# Patient Record
Sex: FEMALE | Race: BLACK OR AFRICAN AMERICAN | Employment: OTHER | URBAN - METROPOLITAN AREA
[De-identification: names, ages, dates, MRNs, and addresses within clinical notes are randomized per-mention and may not be internally consistent; named-entity substitution may affect disease eponyms.]

---

## 2017-01-10 ENCOUNTER — OFFICE VISIT (OUTPATIENT)
Dept: CARDIOLOGY CLINIC | Age: 82
End: 2017-01-10

## 2017-01-10 VITALS
SYSTOLIC BLOOD PRESSURE: 120 MMHG | DIASTOLIC BLOOD PRESSURE: 60 MMHG | WEIGHT: 138 LBS | OXYGEN SATURATION: 97 % | RESPIRATION RATE: 16 BRPM | HEART RATE: 70 BPM | HEIGHT: 64 IN | BODY MASS INDEX: 23.56 KG/M2

## 2017-01-10 DIAGNOSIS — I25.111 ATHEROSCLEROSIS OF NATIVE CORONARY ARTERY OF NATIVE HEART WITH ANGINA PECTORIS WITH DOCUMENTED SPASM (HCC): ICD-10-CM

## 2017-01-10 DIAGNOSIS — E78.2 MIXED HYPERLIPIDEMIA: Primary | ICD-10-CM

## 2017-01-10 DIAGNOSIS — R06.02 SOB (SHORTNESS OF BREATH) ON EXERTION: ICD-10-CM

## 2017-01-10 DIAGNOSIS — Z98.61 POSTSURGICAL PERCUTANEOUS TRANSLUMINAL CORONARY ANGIOPLASTY STATUS: ICD-10-CM

## 2017-01-10 NOTE — PROGRESS NOTES
[address] [place] [address] 60, [place], [address] [facility]     Alexandra Chavez is a 80 y.o. female. Last seen 6 months ago. Subjective:     She notes of chest pain with eating (resembling a heart burn) and usually resolves on its own. She denies her pain occurs with activity or at rest.       BP is at target today. She is adherent with her medications. Lipids are monitored by Dr. Felix Abbott. She notes her levels are at goal today. Patient denies any exertional chest pain, dyspnea, palpitations, syncope, orthopnea, edema or paroxysmal nocturnal dyspnea. Patient Active Problem List    Diagnosis Date Noted    SOB (shortness of breath) on exertion 05/19/2015    Postsurgical percutaneous transluminal coronary angioplasty status 04/27/2012    Coronary atherosclerosis of native coronary artery 04/27/2012    Acute myocardial infarction of other inferior wall, subsequent episode of care 04/27/2012    Mixed hyperlipidemia 04/27/2012      Kayleigh Almanzar MD  Past Medical History   Diagnosis Date    Abnormality of gait     Essential hypertension, benign     Old myocardial infarction     Other acute and subacute form of ischemic heart disease     Peripheral vascular disease, unspecified ([facility] [address]       No past surgical history on file. No Known Allergies   No family history on file. Social History     Social History    Marital status: SINGLE     Spouse name: N/A    Number of children: N/A    Years of education: N/A     Occupational History    Not on file. Social History Main Topics    Smoking status: Never Smoker    Smokeless tobacco: Never Used    Alcohol use Not on file    Drug use: Not on file    Sexual activity: Not on file     Other Topics Concern    Not on file     Social History Narrative      Current Outpatient Prescriptions   Medication Sig    cyanocobalamin (VITAMIN B-12) 1,000 mcg tablet Take 1,000 mcg by mouth daily.     cholecalciferol, vitamin D3, (VITAMIN D3) 2,000 unit Tab Take 1 Cap by mouth daily.  omega-3 fatty acids-vitamin e (FISH OIL) 1,000 mg Cap Take 2 Caps by mouth.  aspirin 81 mg tablet Take 81 mg by mouth.  lisinopril-hydrochlorothiazide (PRINZIDE, ZESTORETIC) 20-12.5 mg per tablet Take 1 Tab by mouth daily.  methimazole (TAPAZOLE) 5 mg tablet Take 5 mg by mouth every other day.  metoprolol (LOPRESSOR) 50 mg tablet Take 50 mg by mouth two (2) times a day.  simvastatin (ZOCOR) 40 mg tablet Take 40 mg by mouth nightly.  valACYclovir (VALTREX) 500 mg tablet Take 500 mg by mouth as needed. No current facility-administered medications for this visit. Review of Systems  Constitutional: Negative for fever, chills, malaise/fatigue and diaphoresis. Respiratory: Negative for cough, hemoptysis, sputum production, shortness of breath and wheezing. Cardiovascular: Negative for chest pain, palpitations, orthopnea, claudication, leg swelling and PND. Positive for heart burn. Gastrointestinal: Negative for heartburn, nausea, vomiting, blood in stool and melena. Genitourinary: Negative for dysuria and flank pain. Musculoskeletal: Negative for joint pain and back pain. Skin: Negative for rash. Neurological: Negative for focal weakness, seizures, loss of consciousness, weakness and headaches. Endo/Heme/Allergies: Does not bruise/bleed easily. Psychiatric/Behavioral: Negative for memory loss. The patient does not have insomnia. Physical Exam:    Visit Vitals    /60  Comment: lt/rt/reg    Pulse 70    Resp 16    Ht 5' 4\" (1.626 m)    Wt 138 lb (62.6 kg)    SpO2 97%    BMI 23.69 kg/m2     Wt Readings from Last 3 Encounters:   01/10/17 138 lb (62.6 kg)   07/19/16 136 lb 3.2 oz (61.8 kg)   12/01/15 142 lb (64.4 kg)       Gen: NAD    Mental Status - Alert. General Appearance - Not in acute distress. Neck - no JVD    Chest and Lung Exam   Inspection: Accessory muscles - No use of accessory muscles in breathing.    Auscultation:   Breath sounds: - Normal.     Cardiovascular   Inspection: Jugular vein - Bilateral - Inspection Normal.   Palpation/Percussion:   Apical Impulse: - Normal.   Auscultation: Rhythm - Regular. Heart Sounds - S1 WNL and S2 WNL. No S3 or S4. Murmurs & Other Heart Sounds: Auscultation of the heart reveals - No Murmurs. Peripheral Vascular   Upper Extremity: Inspection - Bilateral - No Cyanotic nailbeds or Digital clubbing. Lower Extremity:   Palpation: Edema - Bilateral - No edema. Abdomen: Soft, non-tender, bowel sounds are active. Neuro: A&O times 3, CN and motor grossly WNL    Cardiographics  EKG 01/10/17 - SR, old inferior MI      Assessment:     Encounter Diagnoses   Name Primary? 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 Mallory Abdi presents to the clinic today for routine 6 month follow up. She is doing well from a cardiac standpoint. Her blood pressure and lipids are at goal. She seems to be adherent with all of her medications. We discontinued Niacin today. Follow up in 6 months or sooner as needed. EKG OK.       Written by Dayna Parker, as dictated by Ashlyn Frankel MD.

## 2017-01-10 NOTE — MR AVS SNAPSHOT
Visit Information Date & Time Provider Department Dept. Phone Encounter #  
 1/10/2017 10:15 AM Gloria Calderón, [address] [facility] [phone] [id number] Follow-up Instructions Return in about 6 months (around 7/10/2017). Your Appointments 6/13/2017  9:45 AM  
6 MONTH with Gloria Calderón MD  
[place] [facility] [address] [place] [address]) [address] [place] [address] [address] [facility] [address]  
[phone]  
  
   
 [address] [place] [address] [address] Upcoming Health Maintenance Date Due DTaP/Tdap/Td series (1 - Tdap) 8/18/1953 ZOSTER VACCINE AGE 60> 8/18/1992 GLAUCOMA SCREENING Q2Y 8/18/1997 OSTEOPOROSIS SCREENING (DEXA) 8/18/1997 Pneumococcal 65+ Low/Medium Risk (1 of 2 - PCV13) 8/18/1997 MEDICARE YEARLY EXAM 8/18/1997 INFLUENZA AGE 9 TO ADULT 8/1/2016 Allergies as of 1/10/2017  Review Complete On: 1/10/2017 By: Gloria Calderón MD  
 No Known Allergies Current Immunizations  Never Reviewed No immunizations on file. Not reviewed this visit You Were Diagnosed With   
  
 Codes Comments Mixed hyperlipidemia    -  Primary ICD-10-CM: P09.3 ICD-9-CM: 272.2 Atherosclerosis of native coronary artery of native heart with angina pectoris with documented spasm ([facility] [address]     ICD-10-CM: I25.111 ICD-9-CM: 414.01, 413.9 SOB (shortness of breath) on exertion     ICD-10-CM: R06.02 
ICD-9-CM: 786.05 Postsurgical percutaneous transluminal coronary angioplasty status     ICD-10-CM: Z98.61 ICD-9-CM: V45.82 Vitals BP Pulse Resp Height(growth percentile) Weight(growth percentile) SpO2  
 120/60 70 16 5' 4\" (1.626 m) 138 lb (62.6 kg) 97% BMI Smoking Status 23.69 kg/m2 Never Smoker BMI and BSA Data Body Mass Index Body Surface Area  
 23.69 kg/m 2 1.68 m 2 Your Updated Medication List  
  
   
 This list is accurate as of: 1/10/17 10:21 AM.  Always use your most recent med list.  
  
  
  
  
 aspirin 81 mg tablet Take 81 mg by mouth. FISH OIL 1,000 mg Cap Generic drug:  omega-3 fatty acids-vitamin e Take 2 Caps by mouth.  
  
 lisinopril-hydroCHLOROthiazide 20-12.5 mg per tablet Commonly known as:  Ana Rodolfo Take 1 Tab by mouth daily. methIMAzole 5 mg tablet Commonly known as:  TAPAZOLE Take 5 mg by mouth every other day. metoprolol tartrate 50 mg tablet Commonly known as:  LOPRESSOR Take 50 mg by mouth two (2) times a day. simvastatin 40 mg tablet Commonly known as:  ZOCOR Take 40 mg by mouth nightly. valACYclovir 500 mg tablet Commonly known as:  VALTREX Take 500 mg by mouth as needed. VITAMIN B-12 1,000 mcg tablet Generic drug:  cyanocobalamin Take 1,000 mcg by mouth daily. VITAMIN D3 2,000 unit Tab Generic drug:  cholecalciferol (vitamin D3) Take 1 Cap by mouth daily. We Performed the Following AMB POC EKG ROUTINE W/ 12 LEADS, INTER & REP [13261 CPT(R)] Follow-up Instructions Return in about 6 months (around 7/10/2017). Introducing [facility] & HEALTH SERVICES! Concepcion Bahena introduces [employer] patient portal. Now you can access parts of your medical record, email your doctor's office, and request medication refills online. 1. In your internet browser, go to [email or web address] 2. Click on the First Time User? Click Here link in the Sign In box. You will see the New Member Sign Up page. 3. Enter your [employer] Access Code exactly as it appears below. You will not need to use this code after youve completed the sign-up process. If you do not sign up before the expiration date, you must request a new code. · [employer] Access Code: [email or web address] Expires: 4/10/2017  9:48 AM 
 
4.  Enter the last four digits of your Social Security Number (xxxx) and Date of Birth (mm/dd/yyyy) as indicated and click Submit. You will be taken to the next sign-up page. 5. Create a [email or web address] ID. This will be your [email or web address] login ID and cannot be changed, so think of one that is secure and easy to remember. 6. Create a [email or web address] password. You can change your password at any time. 7. Enter your Password Reset Question and Answer. This can be used at a later time if you forget your password. 8. Enter your e-mail address. You will receive e-mail notification when new information is available in [address] 9. Click Sign Up. You can now view and download portions of your medical record. 10. Click the Download Summary menu link to download a portable copy of your medical information. If you have questions, please visit the Frequently Asked Questions section of the [email or web address] website. Remember, [email or web address] is NOT to be used for urgent needs. For medical emergencies, dial 911. Now available from your iPhone and Android! Please provide this summary of care documentation to your next provider. Your primary care clinician is listed as Vamsi Hampton. If you have any questions after today's visit, please call [phone].

## 2017-06-13 ENCOUNTER — OFFICE VISIT (OUTPATIENT)
Dept: CARDIOLOGY CLINIC | Age: 82
End: 2017-06-13

## 2017-06-13 VITALS
OXYGEN SATURATION: 97 % | SYSTOLIC BLOOD PRESSURE: 160 MMHG | DIASTOLIC BLOOD PRESSURE: 74 MMHG | BODY MASS INDEX: 23.05 KG/M2 | WEIGHT: 135 LBS | RESPIRATION RATE: 18 BRPM | HEART RATE: 73 BPM | HEIGHT: 64 IN

## 2017-06-13 DIAGNOSIS — I25.10 ATHEROSCLEROSIS OF NATIVE CORONARY ARTERY OF NATIVE HEART WITHOUT ANGINA PECTORIS: ICD-10-CM

## 2017-06-13 DIAGNOSIS — Z98.61 POSTSURGICAL PERCUTANEOUS TRANSLUMINAL CORONARY ANGIOPLASTY STATUS: ICD-10-CM

## 2017-06-13 DIAGNOSIS — E78.2 MIXED HYPERLIPIDEMIA: Primary | ICD-10-CM

## 2017-06-13 RX ORDER — BISMUTH SUBSALICYLATE 262 MG
1 TABLET,CHEWABLE ORAL DAILY
COMMUNITY

## 2017-06-13 NOTE — PROGRESS NOTES
[address] [place] [address] 60, [place], 16[address] [facility]     Merly Crespo is a 80 y.o. female. Last seen 6 months ago. Subjective:     Mrs. Alverda Spatz is doing well today with no interval issues. She reports being normotensive at home though her systolic is elevated in the office today. Lipids are monitored by Dr. Margarette Samuels and are at goal. Patient denies any exertional chest pain, dyspnea, palpitations, syncope, orthopnea, edema or paroxysmal nocturnal dyspnea. Patient Active Problem List    Diagnosis Date Noted    SOB (shortness of breath) on exertion 05/19/2015    Postsurgical percutaneous transluminal coronary angioplasty status 04/27/2012    Coronary atherosclerosis of native coronary artery 04/27/2012    Acute myocardial infarction of other inferior wall, subsequent episode of care 04/27/2012    Mixed hyperlipidemia 04/27/2012      Dearl MD Diony  Past Medical History:   Diagnosis Date    Abnormality of gait     Essential hypertension, benign     Old myocardial infarction     Other acute and subacute form of ischemic heart disease     Peripheral vascular disease, unspecified ([facility] [address]       No past surgical history on file. No Known Allergies   No family history on file. Social History     Social History    Marital status: SINGLE     Spouse name: N/A    Number of children: N/A    Years of education: N/A     Occupational History    Not on file. Social History Main Topics    Smoking status: Never Smoker    Smokeless tobacco: Never Used    Alcohol use Not on file    Drug use: Not on file    Sexual activity: Not on file     Other Topics Concern    Not on file     Social History Narrative      Current Outpatient Prescriptions   Medication Sig    multivitamin (ONE A DAY) tablet Take 1 Tab by mouth daily.  cyanocobalamin (VITAMIN B-12) 1,000 mcg tablet Take 1,000 mcg by mouth daily.  cholecalciferol, vitamin D3, (VITAMIN D3) 2,000 unit Tab Take 1 Cap by mouth daily.     omega-3 fatty acids-vitamin e (FISH OIL) 1,000 mg Cap Take 2 Caps by mouth.  aspirin 81 mg tablet Take 81 mg by mouth.  lisinopril-hydrochlorothiazide (PRINZIDE, ZESTORETIC) 20-12.5 mg per tablet Take 1 Tab by mouth daily.  methimazole (TAPAZOLE) 5 mg tablet Take 5 mg by mouth every other day.  metoprolol (LOPRESSOR) 50 mg tablet Take 50 mg by mouth two (2) times a day.  simvastatin (ZOCOR) 40 mg tablet Take 40 mg by mouth nightly.  valACYclovir (VALTREX) 500 mg tablet Take 500 mg by mouth as needed. No current facility-administered medications for this visit. Review of Systems  Constitutional: Negative for fever, chills, malaise/fatigue and diaphoresis. Respiratory: Negative for cough, hemoptysis, sputum production, shortness of breath and wheezing. Cardiovascular: Negative for chest pain, palpitations, orthopnea, claudication, leg swelling and PND. Gastrointestinal: Negative for heartburn, nausea, vomiting, blood in stool and melena. Genitourinary: Negative for dysuria and flank pain. Musculoskeletal: Negative for joint pain and back pain. Skin: Negative for rash. Neurological: Negative for focal weakness, seizures, loss of consciousness, weakness and headaches. Endo/Heme/Allergies: Does not bruise/bleed easily. Psychiatric/Behavioral: Negative for memory loss. The patient does not have insomnia. Physical Exam:    Visit Vitals    /74 (BP 1 Location: Right arm, BP Patient Position: Sitting)    Pulse 73    Resp 18    Ht 5' 4\" (1.626 m)    Wt 135 lb (61.2 kg)    SpO2 97%    BMI 23.17 kg/m2     Wt Readings from Last 3 Encounters:   06/13/17 135 lb (61.2 kg)   01/10/17 138 lb (62.6 kg)   07/19/16 136 lb 3.2 oz (61.8 kg)       Gen: NAD    Mental Status - Alert. General Appearance - Not in acute distress. Neck - no JVD    Chest and Lung Exam   Inspection: Accessory muscles - No use of accessory muscles in breathing.    Auscultation:   Breath sounds: - Normal. Cardiovascular   Inspection: Jugular vein - Bilateral - Inspection Normal.   Palpation/Percussion:   Apical Impulse: - Normal.   Auscultation: Rhythm - Regular. Heart Sounds - S1 WNL and S2 WNL. No S3 or S4. Murmurs & Other Heart Sounds: Auscultation of the heart reveals - No Murmurs. Peripheral Vascular   Upper Extremity: Inspection - Bilateral - No Cyanotic nailbeds or Digital clubbing. Lower Extremity:   Palpation: Edema - Bilateral - No edema. Abdomen: Soft, non-tender, bowel sounds are active. Neuro: A&O times 3, CN and motor grossly WNL    Cardiographics  NSR, WNL     Assessment:     Encounter Diagnoses   Name Primary?  Mixed hyperlipidemia Yes    Atherosclerosis of native coronary artery of native heart with angina pectoris with documented spasm [facility])           Plan:     Mrs. Nohemi Anderson is doing well with no recurrent chest pain. Her systolic BP is elevated in the office today, but diastolic is at target therefore will not make any adjustments to her medical regimen. Advised her to follow a low sodium diet. EKG is ok. Follow up in 6 months.

## 2017-07-07 ENCOUNTER — HOSPITAL ENCOUNTER (OUTPATIENT)
Dept: MAMMOGRAPHY | Age: 82
Discharge: HOME | End: 2017-07-07
Attending: FAMILY MEDICINE
Payer: MEDICARE

## 2017-07-07 DIAGNOSIS — Z12.31 VISIT FOR SCREENING MAMMOGRAM: ICD-10-CM

## 2017-07-07 PROCEDURE — 77067 SCR MAMMO BI INCL CAD: CPT

## 2017-07-20 ENCOUNTER — HOSPITAL ENCOUNTER (OUTPATIENT)
Dept: ULTRASOUND IMAGING | Age: 82
Discharge: HOME | End: 2017-07-20
Attending: FAMILY MEDICINE
Payer: MEDICARE

## 2017-07-20 ENCOUNTER — HOSPITAL ENCOUNTER (OUTPATIENT)
Dept: MAMMOGRAPHY | Age: 82
Discharge: HOME | End: 2017-07-20
Attending: FAMILY MEDICINE
Payer: MEDICARE

## 2017-07-20 DIAGNOSIS — R92.8 ABNORMAL MAMMOGRAM OF LEFT BREAST: ICD-10-CM

## 2017-07-20 PROCEDURE — 76642 ULTRASOUND BREAST LIMITED: CPT

## 2017-07-20 PROCEDURE — 77065 DX MAMMO INCL CAD UNI: CPT

## 2017-11-29 ENCOUNTER — HOSPITAL ENCOUNTER (OUTPATIENT)
Dept: NUCLEAR MEDICINE | Age: 82
Discharge: HOME | End: 2017-11-29
Attending: SPECIALIST
Payer: MEDICARE

## 2017-11-29 DIAGNOSIS — E04.1 NONTOXIC UNINODULAR GOITER: ICD-10-CM

## 2017-11-30 ENCOUNTER — HOSPITAL ENCOUNTER (OUTPATIENT)
Dept: NUCLEAR MEDICINE | Age: 82
Discharge: HOME | End: 2017-11-30
Attending: SPECIALIST
Payer: MEDICARE

## 2017-11-30 ENCOUNTER — HOSPITAL ENCOUNTER (OUTPATIENT)
Dept: ULTRASOUND IMAGING | Age: 82
Discharge: HOME | End: 2017-11-30
Attending: SPECIALIST
Payer: MEDICARE

## 2017-11-30 DIAGNOSIS — E04.1 NONTOXIC UNINODULAR GOITER: ICD-10-CM

## 2017-11-30 PROCEDURE — 76536 US EXAM OF HEAD AND NECK: CPT

## 2018-01-22 ENCOUNTER — HOSPITAL ENCOUNTER (OUTPATIENT)
Dept: MAMMOGRAPHY | Age: 83
Discharge: HOME | End: 2018-01-22
Attending: FAMILY MEDICINE
Payer: MEDICARE

## 2018-01-22 ENCOUNTER — HOSPITAL ENCOUNTER (OUTPATIENT)
Dept: ULTRASOUND IMAGING | Age: 83
End: 2018-01-22
Attending: FAMILY MEDICINE
Payer: MEDICARE

## 2018-01-22 DIAGNOSIS — R92.2 BREAST DENSITY: ICD-10-CM

## 2018-01-22 DIAGNOSIS — R92.8 ABNORMAL MAMMOGRAM: ICD-10-CM

## 2018-01-22 PROCEDURE — 77065 DX MAMMO INCL CAD UNI: CPT

## 2018-02-27 ENCOUNTER — OFFICE VISIT (OUTPATIENT)
Dept: CARDIOLOGY CLINIC | Age: 83
End: 2018-02-27

## 2018-02-27 VITALS
WEIGHT: 121.4 LBS | DIASTOLIC BLOOD PRESSURE: 68 MMHG | HEART RATE: 70 BPM | HEIGHT: 64 IN | BODY MASS INDEX: 20.73 KG/M2 | SYSTOLIC BLOOD PRESSURE: 148 MMHG | RESPIRATION RATE: 18 BRPM | OXYGEN SATURATION: 97 %

## 2018-02-27 DIAGNOSIS — E78.2 MIXED HYPERLIPIDEMIA: Primary | ICD-10-CM

## 2018-02-27 DIAGNOSIS — I25.10 ATHEROSCLEROSIS OF NATIVE CORONARY ARTERY OF NATIVE HEART WITHOUT ANGINA PECTORIS: ICD-10-CM

## 2018-02-27 NOTE — PROGRESS NOTES
1. Have you been to the ER, urgent care clinic since your last visit? Hospitalized since your last visit? NO.    2. Have you seen or consulted any other health care providers outside of the [address] [place] [address] since your last visit? Include any pap smears or colon screening. SEES HER PCP.       Chief Complaint   Patient presents with    Other     6 month- pt denies any cardiac symptoms

## 2018-02-27 NOTE — MR AVS SNAPSHOT
[facility] Vijaya [place] [address] 
[phone] Patient: Juliocesar Yeh MRN: [id number] Shani Ohs Visit Information Date & Time Provider Department Dept. Phone Encounter #  
 2/27/2018 10:15 AM Merline Hitch, [address] [place] [address] Cardiology [facility] [phone] [id number] Follow-up Instructions Return in about 6 months (around 8/27/2018). Your Appointments 2/28/2018 11:00 AM  
6 MONTH with Merline Hitch, MD  
[facility] [facility]) Appt Note: Per DR. Nora Akers [address] [place] [address]  
[phone] [address] [facility] Upcoming Health Maintenance Date Due DTaP/Tdap/Td series (1 - Tdap) 8/18/1953 ZOSTER VACCINE AGE 60> 6/18/1992 GLAUCOMA SCREENING Q2Y 8/18/1997 OSTEOPOROSIS SCREENING (DEXA) 8/18/1997 Pneumococcal 65+ Low/Medium Risk (1 of 2 - PCV13) 8/18/1997 MEDICARE YEARLY EXAM 8/18/1997 Influenza Age 5 to Adult 8/1/2017 Allergies as of 2/27/2018  Review Complete On: 2/27/2018 By: Merline Hitch, MD  
 No Known Allergies Current Immunizations  Never Reviewed No immunizations on file. Not reviewed this visit You Were Diagnosed With   
  
 Codes Comments Mixed hyperlipidemia    -  Primary ICD-10-CM: N54.5 ICD-9-CM: 272.2 Atherosclerosis of native coronary artery of native heart without angina pectoris     ICD-10-CM: I25.10 ICD-9-CM: 414.01 Vitals BP Pulse Resp Height(growth percentile) Weight(growth percentile) SpO2  
 148/68 (BP 1 Location: Right arm, BP Patient Position: Sitting) 70 18 5' 4\" (1.626 m) 121 lb 6.4 oz (55.1 kg) 97% BMI Smoking Status 20.84 kg/m2 Never Smoker Vitals History BMI and BSA Data Body Mass Index Body Surface Area  
 20.84 kg/m 2 1.58 m 2 Your Updated Medication List  
  
   
 This list is accurate as of 2/27/18 11:10 AM.  Always use your most recent med list.  
  
  
  
  
 aspirin 81 mg tablet Take 81 mg by mouth. FISH OIL 1,000 mg Cap Generic drug:  omega-3 fatty acids-vitamin e Take 2 Caps by mouth.  
  
 lisinopril-hydroCHLOROthiazide 20-12.5 mg per tablet Commonly known as:  Delora [place] Take 1 Tab by mouth daily. methIMAzole 5 mg tablet Commonly known as:  TAPAZOLE Take 5 mg by mouth every other day. metoprolol tartrate 50 mg tablet Commonly known as:  LOPRESSOR Take 50 mg by mouth two (2) times a day. multivitamin tablet Commonly known as:  ONE A DAY Take 1 Tab by mouth daily. simvastatin 40 mg tablet Commonly known as:  ZOCOR Take 40 mg by mouth nightly. valACYclovir 500 mg tablet Commonly known as:  VALTREX Take 500 mg by mouth as needed. VITAMIN B-12 1,000 mcg tablet Generic drug:  cyanocobalamin Take 1,000 mcg by mouth daily. VITAMIN D3 2,000 unit Tab Generic drug:  cholecalciferol (vitamin D3) Take 1 Cap by mouth daily. We Performed the Following AMB POC EKG ROUTINE W/ 12 LEADS, INTER & REP [62500 CPT(R)] Follow-up Instructions Return in about 6 months (around 8/27/2018). To-Do List   
 05/07/2018 8:30 AM  
  Appointment with Radha Tamayo at [facility] Ultrasound ([phone]) GENERAL INSTRUCTIONS  1. Bring outside films ([employer]) pertaining to the affected area with you on the day of appointment. 2. A written order with a valid diagnosis and Physicians signature is required for all scheduled tests. 3. Check in at registration 30 minutes before your appointment time unless you were instructed to do otherwise. Introducing [facility] & HEALTH SERVICES! Meliton García introduces [employer] patient portal. Now you can access parts of your medical record, email your doctor's office, and request medication refills online. 1. In your internet browser, go to [email or web address] 2. Click on the First Time User? Click Here link in the Sign In box. You will see the New Member Sign Up page. 3. Enter your [employer] Access Code exactly as it appears below. You will not need to use this code after youve completed the sign-up process. If you do not sign up before the expiration date, you must request a new code. · [employer] Access Code: [email or web address] Expires: 5/28/2018  9:49 AM 
 
4. Enter the last four digits of your Social Security Number (xxxx) and Date of Birth (mm/dd/yyyy) as indicated and click Submit. You will be taken to the next sign-up page. 5. Create a WaterBear Softt ID. This will be your PlanHQ login ID and cannot be changed, so think of one that is secure and easy to remember. 6. Create a [employer]HQ password. You can change your password at any time. 7. Enter your Password Reset Question and Answer. This can be used at a later time if you forget your password. 8. Enter your e-mail address. You will receive e-mail notification when new information is available in [address] 9. Click Sign Up. You can now view and download portions of your medical record. 10. Click the Download Summary menu link to download a portable copy of your medical information. If you have questions, please visit the Frequently Asked Questions section of the [employer] website. Remember, [employer] is NOT to be used for urgent needs. For medical emergencies, dial 911. Now available from your iPhone and Android! Please provide this summary of care documentation to your next provider. Your primary care clinician is listed as Vamsi Hampton. If you have any questions after today's visit, please call [phone].

## 2018-02-27 NOTE — PROGRESS NOTES
[address] [place] [address] [address], [place], [address] [facility]     Quinn Robertson is a 80 y.o. female. Last seen 8 months ago. Subjective:       Mrs. Sarah De Jesus is doing well without any interval complaints. She is not having any difficulty breathing. Patient denies any exertional chest pain, dyspnea, palpitations, syncope, orthopnea, edema or paroxysmal nocturnal dyspnea. Patient Active Problem List    Diagnosis Date Noted    SOB (shortness of breath) on exertion 05/19/2015    Postsurgical percutaneous transluminal coronary angioplasty status 04/27/2012    Coronary atherosclerosis of native coronary artery 04/27/2012    Acute myocardial infarction of other inferior wall, subsequent episode of care 04/27/2012    Mixed hyperlipidemia 04/27/2012      Kat Apple MD  Past Medical History:   Diagnosis Date    Abnormality of gait     Essential hypertension, benign     Old myocardial infarction     Other acute and subacute form of ischemic heart disease     Peripheral vascular disease, unspecified       Past Surgical History:   Procedure Laterality Date    HX BREAST BIOPSY Bilateral     x2 benign 10+ years ago     No Known Allergies   No family history on file. Social History     Social History    Marital status: SINGLE     Spouse name: N/A    Number of children: N/A    Years of education: N/A     Occupational History    Not on file. Social History Main Topics    Smoking status: Never Smoker    Smokeless tobacco: Never Used    Alcohol use No    Drug use: No    Sexual activity: Not on file     Other Topics Concern    Not on file     Social History Narrative      Current Outpatient Prescriptions   Medication Sig    multivitamin (ONE A DAY) tablet Take 1 Tab by mouth daily.  cyanocobalamin (VITAMIN B-12) 1,000 mcg tablet Take 1,000 mcg by mouth daily.  cholecalciferol, vitamin D3, (VITAMIN D3) 2,000 unit Tab Take 1 Cap by mouth daily.     omega-3 fatty acids-vitamin e (FISH OIL) 1,000 mg Cap Take 2 Caps by mouth.  aspirin 81 mg tablet Take 81 mg by mouth.  lisinopril-hydrochlorothiazide (PRINZIDE, ZESTORETIC) 20-12.5 mg per tablet Take 1 Tab by mouth daily.  methimazole (TAPAZOLE) 5 mg tablet Take 5 mg by mouth every other day.  metoprolol (LOPRESSOR) 50 mg tablet Take 50 mg by mouth two (2) times a day.  simvastatin (ZOCOR) 40 mg tablet Take 40 mg by mouth nightly.  valACYclovir (VALTREX) 500 mg tablet Take 500 mg by mouth as needed. No current facility-administered medications for this visit. Review of Systems  Constitutional: Negative for fever, chills, malaise/fatigue and diaphoresis. Respiratory: Negative for cough, hemoptysis, sputum production, shortness of breath and wheezing. Cardiovascular: Negative for chest pain, palpitations, orthopnea, claudication, leg swelling and PND. Gastrointestinal: Negative for heartburn, nausea, vomiting, blood in stool and melena. Genitourinary: Negative for dysuria and flank pain. Musculoskeletal: Negative for joint pain and back pain. Skin: Negative for rash. Neurological: Negative for focal weakness, seizures, loss of consciousness, weakness and headaches. Endo/Heme/Allergies: Does not bruise/bleed easily. Psychiatric/Behavioral: Negative for memory loss. The patient does not have insomnia. Physical Exam:    Visit Vitals    /68 (BP 1 Location: Right arm, BP Patient Position: Sitting)    Pulse 70    Resp 18    Ht 5' 4\" (1.626 m)    Wt 121 lb 6.4 oz (55.1 kg)    SpO2 97%    BMI 20.84 kg/m2     Wt Readings from Last 3 Encounters:   02/27/18 121 lb 6.4 oz (55.1 kg)   06/13/17 135 lb (61.2 kg)   01/10/17 138 lb (62.6 kg)       Gen: NAD    Mental Status - Alert. General Appearance - Not in acute distress. Neck - no JVD    Chest and Lung Exam   Inspection: Accessory muscles - No use of accessory muscles in breathing.    Auscultation:   Breath sounds: - Normal.     Cardiovascular   Inspection: Jugular vein - Bilateral - Inspection Normal.   Palpation/Percussion:   Apical Impulse: - Normal.   Auscultation: Rhythm - Regular. Heart Sounds - S1 WNL and S2 WNL. No S3 or S4. Murmurs & Other Heart Sounds: Auscultation of the heart reveals - No Murmurs. Peripheral Vascular   Upper Extremity: Inspection - Bilateral - No Cyanotic nailbeds or Digital clubbing. Lower Extremity:   Palpation: Edema - Bilateral - No edema. Abdomen: Soft, non-tender, bowel sounds are active. Neuro: A&O times 3, CN and motor grossly WNL    Cardiographics  EKG 2/27/18 - SR, single PVC     Assessment:     Encounter Diagnoses   Name Primary?  Mixed hyperlipidemia Yes    Atherosclerosis of native coronary artery of native heart without angina pectoris           Plan:     Mrs. Rosibel Soni is doing well with no recurrent chest pain. Her systolic BP is elevated in the office today, but diastolic is at target therefore will not make any adjustments to her medical regimen. Advised her to follow a low sodium diet. EKG is normal today. Lipids per PCP     Follow up in 6 months.      Written by Roma Aguilar, as dictated by Manuel Rodriguez MD.

## 2018-05-07 ENCOUNTER — HOSPITAL ENCOUNTER (OUTPATIENT)
Dept: ULTRASOUND IMAGING | Age: 83
Discharge: HOME | End: 2018-05-07
Attending: SPECIALIST
Payer: MEDICARE

## 2018-05-07 DIAGNOSIS — E04.1 NONTOXIC UNINODULAR GOITER: ICD-10-CM

## 2018-05-07 PROCEDURE — 76536 US EXAM OF HEAD AND NECK: CPT

## 2018-08-21 ENCOUNTER — OFFICE VISIT (OUTPATIENT)
Dept: CARDIOLOGY CLINIC | Age: 83
End: 2018-08-21

## 2018-08-21 VITALS
RESPIRATION RATE: 16 BRPM | OXYGEN SATURATION: 98 % | SYSTOLIC BLOOD PRESSURE: 130 MMHG | HEIGHT: 64 IN | WEIGHT: 130 LBS | BODY MASS INDEX: 22.2 KG/M2 | HEART RATE: 70 BPM | DIASTOLIC BLOOD PRESSURE: 62 MMHG

## 2018-08-21 DIAGNOSIS — I25.10 ATHEROSCLEROSIS OF NATIVE CORONARY ARTERY OF NATIVE HEART WITHOUT ANGINA PECTORIS: ICD-10-CM

## 2018-08-21 DIAGNOSIS — E78.2 MIXED HYPERLIPIDEMIA: Primary | ICD-10-CM

## 2018-08-21 NOTE — PROGRESS NOTES
1. Have you been to the ER, urgent care clinic since your last visit? Hospitalized since your last visit? NO.    2. Have you seen or consulted any other health care providers outside of the [facility] since your last visit? Include any pap smears or colon screening. NO.       Chief Complaint   Patient presents with    Other     6 MONTH- ON AND OFF DISCOMFORT IN CHEST

## 2019-01-03 ENCOUNTER — HOSPITAL ENCOUNTER (OUTPATIENT)
Dept: ULTRASOUND IMAGING | Age: 84
Discharge: HOME | End: 2019-01-03
Attending: SPECIALIST
Payer: MEDICARE

## 2019-01-03 DIAGNOSIS — E04.1 NONTOXIC UNINODULAR GOITER: ICD-10-CM

## 2019-01-03 PROCEDURE — 76536 US EXAM OF HEAD AND NECK: CPT

## 2019-03-12 ENCOUNTER — OFFICE VISIT (OUTPATIENT)
Dept: CARDIOLOGY CLINIC | Age: 84
End: 2019-03-12

## 2019-03-12 VITALS
RESPIRATION RATE: 16 BRPM | OXYGEN SATURATION: 98 % | DIASTOLIC BLOOD PRESSURE: 62 MMHG | HEIGHT: 64 IN | WEIGHT: 126.4 LBS | HEART RATE: 76 BPM | SYSTOLIC BLOOD PRESSURE: 122 MMHG | BODY MASS INDEX: 21.58 KG/M2

## 2019-03-12 DIAGNOSIS — E78.2 MIXED HYPERLIPIDEMIA: Primary | ICD-10-CM

## 2019-03-12 DIAGNOSIS — I25.10 ATHEROSCLEROSIS OF NATIVE CORONARY ARTERY OF NATIVE HEART WITHOUT ANGINA PECTORIS: ICD-10-CM

## 2019-03-12 NOTE — PROGRESS NOTES
[address] [place] [address] [address], [place], [address] [facility]     Maikol Gay is a 80 y.o. female. Last seen 6 months ago. Subjective:     Mrs. Katherine Mayers states she is doing well. She states she continues to have rare episodes of chest pain, which are unchanged from previous. Her lipids are managed by Minna Juarez MD.     Patient denies any dyspnea, palpitations, syncope, orthopnea, edema or paroxysmal nocturnal dyspnea. Patient Active Problem List    Diagnosis Date Noted    SOB (shortness of breath) on exertion 05/19/2015    Postsurgical percutaneous transluminal coronary angioplasty status 04/27/2012    Coronary atherosclerosis of native coronary artery 04/27/2012    Acute myocardial infarction of other inferior wall, subsequent episode of care 04/27/2012    Mixed hyperlipidemia 04/27/2012      Minna Juarez MD  Past Medical History:   Diagnosis Date    Abnormality of gait     Essential hypertension, benign     Old myocardial infarction     Other acute and subacute form of ischemic heart disease     Peripheral vascular disease, unspecified ([facility] [address]       Past Surgical History:   Procedure Laterality Date    HX BREAST BIOPSY Bilateral     x2 benign 10+ years ago     No Known Allergies   History reviewed. No pertinent family history.    Social History     Socioeconomic History    Marital status: SINGLE     Spouse name: Not on file    Number of children: Not on file    Years of education: Not on file    Highest education level: Not on file   Social Needs    Financial resource strain: Not on file    Food insecurity - worry: Not on file    Food insecurity - inability: Not on file    Transportation needs - medical: Not on file   [employer] needs - non-medical: Not on file   Occupational History    Not on file   Tobacco Use    Smoking status: Never Smoker    Smokeless tobacco: Never Used   Substance and Sexual Activity    Alcohol use: No    Drug use: No    Sexual activity: Not on file Other Topics Concern    Not on file   Social History Narrative    Not on file      Current Outpatient Medications   Medication Sig    multivitamin (ONE A DAY) tablet Take 1 Tab by mouth daily.  cyanocobalamin (VITAMIN B-12) 1,000 mcg tablet Take 1,000 mcg by mouth daily.  cholecalciferol, vitamin D3, (VITAMIN D3) 2,000 unit Tab Take 1 Cap by mouth daily.  omega-3 fatty acids-vitamin e (FISH OIL) 1,000 mg Cap Take 2 Caps by mouth.  aspirin 81 mg tablet Take 81 mg by mouth daily.  lisinopril-hydrochlorothiazide (PRINZIDE, ZESTORETIC) 20-12.5 mg per tablet Take 1 Tab by mouth daily.  methimazole (TAPAZOLE) 5 mg tablet Take 5 mg by mouth every other day.  metoprolol (LOPRESSOR) 50 mg tablet Take 50 mg by mouth two (2) times a day.  simvastatin (ZOCOR) 40 mg tablet Take 40 mg by mouth nightly.  valACYclovir (VALTREX) 500 mg tablet Take 500 mg by mouth as needed. No current facility-administered medications for this visit. Review of Systems  Constitutional: Negative for fever, chills, malaise/fatigue and diaphoresis. Respiratory: Negative for cough, hemoptysis, sputum production, shortness of breath and wheezing. Cardiovascular: Negative for chest pain, palpitations, orthopnea, claudication, leg swelling and PND. Gastrointestinal: Negative for heartburn, nausea, vomiting, blood in stool and melena. Genitourinary: Negative for dysuria and flank pain. Musculoskeletal: Negative for joint pain and back pain. Skin: Negative for rash. Neurological: Negative for focal weakness, seizures, loss of consciousness, weakness and headaches. Endo/Heme/Allergies: Does not bruise/bleed easily. Psychiatric/Behavioral: Negative for memory loss. The patient does not have insomnia.     Physical Exam:    Visit Vitals  /62 (BP 1 Location: Left arm, BP Patient Position: Sitting)   Pulse 76   Resp 16   Ht 5' 4\" (1.626 m)   Wt 126 lb 6.4 oz (57.3 kg)   SpO2 98%   BMI 21.70 kg/m²     Wt Readings from Last 3 Encounters:   03/12/19 126 lb 6.4 oz (57.3 kg)   08/21/18 130 lb (59 kg)   02/27/18 121 lb 6.4 oz (55.1 kg)       Gen: NAD    Mental Status - Alert. General Appearance - Not in acute distress. Neck - no JVD    Chest and Lung Exam   Inspection: Accessory muscles - No use of accessory muscles in breathing. Auscultation:   Breath sounds: - Normal.     Cardiovascular   Inspection: Jugular vein - Bilateral - Inspection Normal.   Palpation/Percussion:   Apical Impulse: - Normal.   Auscultation: Rhythm - Regular. Heart Sounds - S1 WNL and S2 WNL. No S3 or S4. Murmurs & Other Heart Sounds: Auscultation of the heart reveals - No Murmurs. Peripheral Vascular   Upper Extremity: Inspection - Bilateral - No Cyanotic nailbeds or Digital clubbing. Lower Extremity:   Palpation: Edema - Bilateral - No edema. Abdomen: Soft, non-tender, bowel sounds are active. Neuro: A&O times 3, CN and motor grossly WNL    Cardiographics  EKG 3/12/19 - SR, normal     Assessment:     Encounter Diagnoses   Name Primary?  Mixed hyperlipidemia Yes    Atherosclerosis of native coronary artery of native heart without angina pectoris       Plan:     Mrs. Walker is doing well. Her BP and EKG are normal. I recommended she continue her current medical regimen. Asymptomatic. Her lipids are followed by Juma Santizo MD, who she sees Q3M.      Follow up in 6 months or PRN    Written by Carson Hayes, as dictated by Dr. Adrian Middleton.

## 2019-03-12 NOTE — PROGRESS NOTES
1. Have you been to the ER, urgent care clinic since your last visit? Hospitalized since your last visit? No.    2. Have you seen or consulted any other health care providers outside of the [address] [place] [address] since your last visit? Include any pap smears or colon screening.    No.      Chief Complaint   Patient presents with    Follow-up     6 month- pt denies any cardiac symptoms

## 2019-09-10 ENCOUNTER — OFFICE VISIT (OUTPATIENT)
Dept: CARDIOLOGY CLINIC | Age: 84
End: 2019-09-10

## 2019-09-10 VITALS
DIASTOLIC BLOOD PRESSURE: 72 MMHG | OXYGEN SATURATION: 97 % | HEIGHT: 64 IN | BODY MASS INDEX: 21.27 KG/M2 | RESPIRATION RATE: 16 BRPM | WEIGHT: 124.6 LBS | HEART RATE: 71 BPM | SYSTOLIC BLOOD PRESSURE: 150 MMHG

## 2019-09-10 DIAGNOSIS — Z98.61 POSTSURGICAL PERCUTANEOUS TRANSLUMINAL CORONARY ANGIOPLASTY STATUS: Primary | ICD-10-CM

## 2019-09-10 DIAGNOSIS — E78.2 MIXED HYPERLIPIDEMIA: ICD-10-CM

## 2019-09-10 DIAGNOSIS — I25.10 ATHEROSCLEROSIS OF NATIVE CORONARY ARTERY OF NATIVE HEART WITHOUT ANGINA PECTORIS: ICD-10-CM

## 2019-09-10 RX ORDER — LISINOPRIL 20 MG/1
20 TABLET ORAL DAILY
COMMUNITY
Start: 2019-08-05 | End: 2020-07-15 | Stop reason: ALTCHOICE

## 2019-09-10 NOTE — PROGRESS NOTES
[address], [facility]     Cain Ren is a 80 y.o. female. Last seen 6 months ago. Subjective:     Cain Ren reports she is feeling well overall with no interval issues, no chest pain. She had a fall recently and hurt her leg. She says her BP was 906 systolic on Saturday 3/1/56. Her lipids are managed by Nohemi Turpin MD.     Patient denies any dyspnea, palpitations, syncope, orthopnea, edema or paroxysmal nocturnal dyspnea. Patient Active Problem List    Diagnosis Date Noted    SOB (shortness of breath) on exertion 05/19/2015    Postsurgical percutaneous transluminal coronary angioplasty status 04/27/2012    Coronary atherosclerosis of native coronary artery 04/27/2012    Acute myocardial infarction of other inferior wall, subsequent episode of care 04/27/2012    Mixed hyperlipidemia 04/27/2012      Nohemi Turpin MD  Past Medical History:   Diagnosis Date    Abnormality of gait     Essential hypertension, benign     Old myocardial infarction     Other acute and subacute form of ischemic heart disease     Peripheral vascular disease, unspecified ([facility] [address]       Past Surgical History:   Procedure Laterality Date    HX BREAST BIOPSY Bilateral     x2 benign 10+ years ago     No Known Allergies   History reviewed. No pertinent family history.    Social History     Socioeconomic History    Marital status: SINGLE     Spouse name: Not on file    Number of children: Not on file    Years of education: Not on file    Highest education level: Not on file   Occupational History    Not on file   Social Needs    Financial resource strain: Not on file    Food insecurity:     Worry: Not on file     Inability: Not on file    Transportation needs:     Medical: Not on file     Non-medical: Not on file   Tobacco Use    Smoking status: Never Smoker    Smokeless tobacco: Never Used   Substance and Sexual Activity    Alcohol use: No    Drug use: No    Sexual activity: Not on file   Lifestyle    Physical activity:     Days per week: Not on file     Minutes per session: Not on file    Stress: Not on file   Relationships    Social connections:     Talks on phone: Not on file     Gets together: Not on file     Attends Sabianism service: Not on file     Active member of club or organization: Not on file     Attends meetings of clubs or organizations: Not on file     Relationship status: Not on file    Intimate partner violence:     Fear of current or ex partner: Not on file     Emotionally abused: Not on file     Physically abused: Not on file     Forced sexual activity: Not on file   Other Topics Concern    Not on file   Social History Narrative    Not on file      Current Outpatient Medications   Medication Sig    multivitamin (ONE A DAY) tablet Take 1 Tab by mouth daily.  cyanocobalamin (VITAMIN B-12) 1,000 mcg tablet Take 1,000 mcg by mouth daily.  cholecalciferol, vitamin D3, (VITAMIN D3) 2,000 unit Tab Take 1 Cap by mouth daily.  omega-3 fatty acids-vitamin e (FISH OIL) 1,000 mg Cap Take 2 Caps by mouth.  aspirin 81 mg tablet Take 81 mg by mouth daily.  methimazole (TAPAZOLE) 5 mg tablet Take 5 mg by mouth every other day.  metoprolol (LOPRESSOR) 50 mg tablet Take 50 mg by mouth two (2) times a day.  simvastatin (ZOCOR) 40 mg tablet Take 40 mg by mouth nightly.  lisinopril (PRINIVIL, ZESTRIL) 20 mg tablet     valACYclovir (VALTREX) 500 mg tablet Take 500 mg by mouth as needed.  lisinopril-hydrochlorothiazide (PRINZIDE, ZESTORETIC) 20-12.5 mg per tablet Take 1 Tab by mouth daily. No current facility-administered medications for this visit. Review of Systems  Constitutional: Negative for fever, chills, malaise/fatigue and diaphoresis. Respiratory: Negative for cough, hemoptysis, sputum production, shortness of breath and wheezing.    Cardiovascular: Negative for chest pain, palpitations, orthopnea, claudication, leg swelling and PND. Gastrointestinal: Negative for heartburn, nausea, vomiting, blood in stool and melena. Genitourinary: Negative for dysuria and flank pain. Musculoskeletal: Negative for joint pain and back pain. Skin: Negative for rash. Neurological: Negative for focal weakness, seizures, loss of consciousness, weakness and headaches. Endo/Heme/Allergies: Does not bruise/bleed easily. Psychiatric/Behavioral: Negative for memory loss. The patient does not have insomnia. Physical Exam:    Visit Vitals  /72 (BP 1 Location: Left arm, BP Patient Position: Sitting)   Pulse 71   Resp 16   Ht 5' 4\" (1.626 m)   Wt 124 lb 9.6 oz (56.5 kg)   SpO2 97%   BMI 21.39 kg/m²     Wt Readings from Last 3 Encounters:   09/10/19 124 lb 9.6 oz (56.5 kg)   03/12/19 126 lb 6.4 oz (57.3 kg)   08/21/18 130 lb (59 kg)       Gen: NAD    Mental Status - Alert. General Appearance - Not in acute distress. Neck - no JVD    Chest and Lung Exam   Inspection: Accessory muscles - No use of accessory muscles in breathing. Auscultation:   Breath sounds: - Normal.     Cardiovascular   Inspection: Jugular vein - Bilateral - Inspection Normal.   Palpation/Percussion:   Apical Impulse: - Normal.   Auscultation: Rhythm - Regular. Heart Sounds - S1 WNL and S2 WNL. No S3 or S4. Murmurs & Other Heart Sounds: Auscultation of the heart reveals - No Murmurs. Peripheral Vascular   Upper Extremity: Inspection - Bilateral - No Cyanotic nailbeds or Digital clubbing. Lower Extremity:   Palpation: Edema - Bilateral - No edema. Abdomen: Soft, non-tender, bowel sounds are active. Neuro: A&O times 3, CN and motor grossly WNL    Cardiographics  EKG 3/12/19 - SR, normal  EKG 9/10/19 - SR normal     Assessment:     Encounter Diagnoses   Name Primary?     Postsurgical percutaneous transluminal coronary angioplasty status Yes    Mixed hyperlipidemia     Atherosclerosis of native coronary artery of native heart without angina pectoris Plan:     Mrs. Arie Vega is doing well. Her BP and EKG are normal. I recommended she continue her current medical regimen. Asymptomatic.     Her lipids are followed by Maldonado Bassett MD, who she sees Q3M.      Follow up in 6 months or PRN    Written by Chery Cullen, as dictated by Iva Murphy M.D.

## 2019-09-10 NOTE — LETTER
9/10/19 Patient: Dewey Landeros YOB: 1932 Date of Visit: 9/10/2019 [facility] [address] [facility] [address] VIA In Basket Dear Faye Foley MD, Thank you for referring Ms. Dewey Landeros to [facility] for evaluation. My notes for this consultation are attached. If you have questions, please do not hesitate to call me. I look forward to following your patient along with you.  
 
 
Sincerely, 
 
Ronda Dotson MD

## 2019-09-10 NOTE — PROGRESS NOTES
1. Have you been to the ER, urgent care clinic since your last visit? Hospitalized since your last visit? No.    2. Have you seen or consulted any other health care providers outside of the [address] [place] [address] since your last visit? Include any pap smears or colon screening. Seen by PCP. Chief Complaint   Patient presents with    Follow-up     6 month- pt denies any cardiac symptoms     PT WILL CALL BACK WITH THE CORRECT LISINOPRIL MEDICATION.

## 2020-02-11 ENCOUNTER — OFFICE VISIT (OUTPATIENT)
Dept: CARDIOLOGY CLINIC | Age: 85
End: 2020-02-11

## 2020-02-11 VITALS
DIASTOLIC BLOOD PRESSURE: 82 MMHG | HEIGHT: 64 IN | OXYGEN SATURATION: 98 % | RESPIRATION RATE: 18 BRPM | SYSTOLIC BLOOD PRESSURE: 160 MMHG | HEART RATE: 74 BPM | BODY MASS INDEX: 20.83 KG/M2 | WEIGHT: 122 LBS

## 2020-02-11 DIAGNOSIS — I25.10 ATHEROSCLEROSIS OF NATIVE CORONARY ARTERY OF NATIVE HEART WITHOUT ANGINA PECTORIS: Primary | ICD-10-CM

## 2020-02-11 DIAGNOSIS — Z98.61 POSTSURGICAL PERCUTANEOUS TRANSLUMINAL CORONARY ANGIOPLASTY STATUS: ICD-10-CM

## 2020-02-11 DIAGNOSIS — E78.2 MIXED HYPERLIPIDEMIA: ICD-10-CM

## 2020-02-11 RX ORDER — AMLODIPINE BESYLATE 2.5 MG/1
2.5 TABLET ORAL DAILY
Qty: 90 TAB | Refills: 3 | Status: SHIPPED | OUTPATIENT
Start: 2020-02-11 | End: 2020-08-11 | Stop reason: ALTCHOICE

## 2020-02-11 NOTE — PROGRESS NOTES
[address] [place] [address] [address], [place], [address] [facility]     Gladys Conley is a 80 y.o. female. Last seen 5 months ago. Subjective:     Gladys Conley reports intermittent, mild chest pain that doesn't last long, usually after eating. Her BP at home is labile, but reports higher BP on average. Her lipids are managed by Cecilia Day MD.     Patient denies any dyspnea, palpitations, syncope, orthopnea, edema or paroxysmal nocturnal dyspnea. Patient Active Problem List    Diagnosis Date Noted    SOB (shortness of breath) on exertion 05/19/2015    Postsurgical percutaneous transluminal coronary angioplasty status 04/27/2012    Coronary atherosclerosis of native coronary artery 04/27/2012    Acute myocardial infarction of other inferior wall, subsequent episode of care 04/27/2012    Mixed hyperlipidemia 04/27/2012      Cecilia Day MD  Past Medical History:   Diagnosis Date    Abnormality of gait     Essential hypertension, benign     Old myocardial infarction     Other acute and subacute form of ischemic heart disease     Peripheral vascular disease, unspecified ([facility] [address]       Past Surgical History:   Procedure Laterality Date    HX BREAST BIOPSY Bilateral     x2 benign 10+ years ago     No Known Allergies   History reviewed. No pertinent family history.    Social History     Socioeconomic History    Marital status: SINGLE     Spouse name: Not on file    Number of children: Not on file    Years of education: Not on file    Highest education level: Not on file   Occupational History    Not on file   Social Needs    Financial resource strain: Not on file    Food insecurity:     Worry: Not on file     Inability: Not on file    Transportation needs:     Medical: Not on file     Non-medical: Not on file   Tobacco Use    Smoking status: Never Smoker    Smokeless tobacco: Never Used   Substance and Sexual Activity    Alcohol use: No    Drug use: No    Sexual activity: Not on file Lifestyle    Physical activity:     Days per week: Not on file     Minutes per session: Not on file    Stress: Not on file   Relationships    Social connections:     Talks on phone: Not on file     Gets together: Not on file     Attends Sabianist service: Not on file     Active member of club or organization: Not on file     Attends meetings of clubs or organizations: Not on file     Relationship status: Not on file    Intimate partner violence:     Fear of current or ex partner: Not on file     Emotionally abused: Not on file     Physically abused: Not on file     Forced sexual activity: Not on file   Other Topics Concern    Not on file   Social History Narrative    Not on file      Current Outpatient Medications   Medication Sig    lisinopril (PRINIVIL, ZESTRIL) 20 mg tablet Take 20 mg by mouth daily.  multivitamin (ONE A DAY) tablet Take 1 Tab by mouth daily.  cyanocobalamin (VITAMIN B-12) 1,000 mcg tablet Take 1,000 mcg by mouth daily.  cholecalciferol, vitamin D3, (VITAMIN D3) 2,000 unit Tab Take 1 Cap by mouth daily.  omega-3 fatty acids-vitamin e (FISH OIL) 1,000 mg Cap Take 2 Caps by mouth.  aspirin 81 mg tablet Take 81 mg by mouth daily.  methimazole (TAPAZOLE) 5 mg tablet Take 5 mg by mouth every other day.  metoprolol (LOPRESSOR) 50 mg tablet Take 50 mg by mouth two (2) times a day.  simvastatin (ZOCOR) 40 mg tablet Take 40 mg by mouth nightly.  valACYclovir (VALTREX) 500 mg tablet Take 500 mg by mouth as needed.  lisinopril-hydrochlorothiazide (PRINZIDE, ZESTORETIC) 20-12.5 mg per tablet Take 1 Tab by mouth daily. No current facility-administered medications for this visit. Review of Systems  Constitutional: Negative for fever, chills, malaise/fatigue and diaphoresis. Respiratory: Negative for cough, hemoptysis, sputum production, shortness of breath and wheezing.    Cardiovascular: Negative for palpitations, orthopnea, claudication, leg swelling and PND. +chest pain  Gastrointestinal: Negative for heartburn, nausea, vomiting, blood in stool and melena. Genitourinary: Negative for dysuria and flank pain. Musculoskeletal: Negative for joint pain and back pain. Skin: Negative for rash. Neurological: Negative for focal weakness, seizures, loss of consciousness, weakness and headaches. Endo/Heme/Allergies: Does not bruise/bleed easily. Psychiatric/Behavioral: Negative for memory loss. The patient does not have insomnia. Physical Exam:    Visit Vitals  /82 (BP 1 Location: Right arm, BP Patient Position: Sitting)   Pulse 74   Resp 18   Ht 5' 4\" (1.626 m)   Wt 122 lb (55.3 kg)   SpO2 98%   BMI 20.94 kg/m²     Wt Readings from Last 3 Encounters:   02/11/20 122 lb (55.3 kg)   09/10/19 124 lb 9.6 oz (56.5 kg)   03/12/19 126 lb 6.4 oz (57.3 kg)       Gen: NAD    Mental Status - Alert. General Appearance - Not in acute distress. Neck - no JVD    Chest and Lung Exam   Inspection: Accessory muscles - No use of accessory muscles in breathing. Auscultation:   Breath sounds: - Normal.     Cardiovascular   Inspection: Jugular vein - Bilateral - Inspection Normal.   Palpation/Percussion:   Apical Impulse: - Normal.   Auscultation: Rhythm - Regular. Heart Sounds - S1 WNL and S2 WNL. No S3 or S4. Murmurs & Other Heart Sounds: Auscultation of the heart reveals - No Murmurs. Peripheral Vascular   Upper Extremity: Inspection - Bilateral - No Cyanotic nailbeds or Digital clubbing. Lower Extremity:   Palpation: Edema - Bilateral - No edema. Abdomen: Soft, non-tender, bowel sounds are active. Neuro: A&O times 3, CN and motor grossly WNL    Cardiographics  EKG 2/11/20 - SR normal,      Assessment:     Encounter Diagnoses   Name Primary?     Atherosclerosis of native coronary artery of native heart without angina pectoris Yes    Postsurgical percutaneous transluminal coronary angioplasty status     Mixed hyperlipidemia       Plan:     Mrs. Sangita Dejesus is doing well. Her EKG is normal, but BP is elevated. I recommended she continue her current medical regimen. Add amlodipine 2.5 mg/d.   No angina    Her lipids are followed by Sebastian Real MD, who she sees [facility]3M.      Follow up in 1 month or PRN    Written by Tim Blackman, as dictated by Jasvir Cyr M.D.

## 2020-02-11 NOTE — LETTER
2/11/20 Patient: Rowena Davis YOB: 1932 Date of Visit: 2/11/2020 [address]odd [address] [id number] VIA In Basket Dear Velia Moreno MD, Thank you for referring Ms. Rowena Davis to [facility] for evaluation. My notes for this consultation are attached. If you have questions, please do not hesitate to call me. I look forward to following your patient along with you.  
 
 
Sincerely, 
 
Luis Alberto Frederick MD

## 2020-02-11 NOTE — PROGRESS NOTES
1. Have you been to the ER, urgent care clinic since your last visit? Hospitalized since your last visit? No.    2. Have you seen or consulted any other health care providers outside of the [address] [place] [address] since your last visit? Include any pap smears or colon screening.    No.      Chief Complaint   Patient presents with    Follow-up     6 month- chest pain on and off

## 2020-06-11 NOTE — PROGRESS NOTES
Chief Complaint   Patient presents with    Other     6 month-pt denies any cardiac symptoms Notify patient that mammogram is WNL, but she has dense breast tissue. Women with dense breasts have a modestly increased risk of breast cancer and experience reduced sensitivity of mammography to detect breast cancer.The American College of Obstetricians and Gynecologists does not recommend routine use of alternative or adjunctive tests to screening mammography in women with dense breasts who are asymptomatic and have no additional risk factors. She may have WBUS  if she desires.     Send ACOG  Committee Opinion # [id number]  \"Management of Women  with Dense Breasts Diagnosed by Mammography' if she desires it.

## 2020-07-01 ENCOUNTER — HOSPITAL ENCOUNTER (INPATIENT)
Age: 85
LOS: 3 days | Discharge: HOME | DRG: 246 | End: 2020-07-04
Attending: EMERGENCY MEDICINE | Admitting: HOSPITALIST
Payer: MEDICARE

## 2020-07-01 ENCOUNTER — APPOINTMENT (OUTPATIENT)
Dept: GENERAL RADIOLOGY | Age: 85
DRG: 246 | End: 2020-07-01
Attending: EMERGENCY MEDICINE
Payer: MEDICARE

## 2020-07-01 ENCOUNTER — APPOINTMENT (OUTPATIENT)
Dept: CT IMAGING | Age: 85
DRG: 246 | End: 2020-07-01
Attending: EMERGENCY MEDICINE
Payer: MEDICARE

## 2020-07-01 DIAGNOSIS — J81.0 ACUTE PULMONARY EDEMA (HCC): ICD-10-CM

## 2020-07-01 DIAGNOSIS — J96.01 ACUTE RESPIRATORY FAILURE WITH HYPOXIA (HCC): Primary | ICD-10-CM

## 2020-07-01 DIAGNOSIS — I24.9 ACS (ACUTE CORONARY SYNDROME) (HCC): ICD-10-CM

## 2020-07-01 PROBLEM — J81.1 PULMONARY EDEMA: Status: ACTIVE | Noted: 2020-07-01

## 2020-07-01 PROBLEM — I21.4 NSTEMI (NON-ST ELEVATED MYOCARDIAL INFARCTION) (HCC): Status: ACTIVE | Noted: 2020-07-01

## 2020-07-01 LAB
ALBUMIN SERPL-MCNC: 3.2 G/DL (ref 3.5–5)
ALBUMIN/GLOB SERPL: 0.7 {RATIO} (ref 1.1–2.2)
ALP SERPL-CCNC: 90 U/L (ref 45–117)
ALT SERPL-CCNC: 38 U/L (ref 12–78)
ANION GAP SERPL CALC-SCNC: 10 MMOL/L (ref 5–15)
APPEARANCE UR: CLEAR
AST SERPL-CCNC: 24 U/L (ref 15–37)
BACTERIA URNS QL MICRO: ABNORMAL /HPF
BASOPHILS # BLD: 0.1 K/UL (ref 0–0.1)
BASOPHILS NFR BLD: 1 % (ref 0–1)
BILIRUB SERPL-MCNC: 0.7 MG/DL (ref 0.2–1)
BILIRUB UR QL: NEGATIVE
BNP SERPL-MCNC: 1656 PG/ML
BUN SERPL-MCNC: 20 MG/DL (ref 6–20)
BUN/CREAT SERPL: 18 (ref 12–20)
CALCIUM SERPL-MCNC: 8.6 MG/DL (ref 8.5–10.1)
CHLORIDE SERPL-SCNC: 110 MMOL/L (ref 97–108)
CK MB CFR SERPL CALC: 2.4 % (ref 0–2.5)
CK MB SERPL-MCNC: 2.6 NG/ML (ref 5–25)
CK SERPL-CCNC: 109 U/L (ref 26–192)
CO2 SERPL-SCNC: 23 MMOL/L (ref 21–32)
COLOR UR: ABNORMAL
COMMENT, HOLDF: NORMAL
COVID-19 RAPID TEST, COVR: NOT DETECTED
CREAT SERPL-MCNC: 1.09 MG/DL (ref 0.55–1.02)
CRP SERPL HS-MCNC: 0.3 MG/L
D DIMER PPP FEU-MCNC: 3.26 MG/L FEU (ref 0–0.65)
DIFFERENTIAL METHOD BLD: ABNORMAL
EOSINOPHIL # BLD: 0.3 K/UL (ref 0–0.4)
EOSINOPHIL NFR BLD: 3 % (ref 0–7)
EPITH CASTS URNS QL MICRO: ABNORMAL /LPF
ERYTHROCYTE [DISTWIDTH] IN BLOOD BY AUTOMATED COUNT: 13.5 % (ref 11.5–14.5)
ERYTHROCYTE [SEDIMENTATION RATE] IN BLOOD: 26 MM/HR (ref 0–30)
EST. AVERAGE GLUCOSE BLD GHB EST-MCNC: 131 MG/DL
FERRITIN SERPL-MCNC: 39 NG/ML (ref 8–252)
GLOBULIN SER CALC-MCNC: 4.4 G/DL (ref 2–4)
GLUCOSE BLD STRIP.AUTO-MCNC: 119 MG/DL (ref 65–100)
GLUCOSE BLD STRIP.AUTO-MCNC: 93 MG/DL (ref 65–100)
GLUCOSE SERPL-MCNC: 367 MG/DL (ref 65–100)
GLUCOSE UR STRIP.AUTO-MCNC: NEGATIVE MG/DL
HBA1C MFR BLD: 6.2 % (ref 4–5.6)
HCT VFR BLD AUTO: 42.5 % (ref 35–47)
HGB BLD-MCNC: 13.1 G/DL (ref 11.5–16)
HGB UR QL STRIP: ABNORMAL
IMM GRANULOCYTES # BLD AUTO: 0 K/UL (ref 0–0.04)
IMM GRANULOCYTES NFR BLD AUTO: 0 % (ref 0–0.5)
KETONES UR QL STRIP.AUTO: NEGATIVE MG/DL
LDH SERPL L TO P-CCNC: 293 U/L (ref 81–246)
LEUKOCYTE ESTERASE UR QL STRIP.AUTO: ABNORMAL
LYMPHOCYTES # BLD: 4.1 K/UL (ref 0.8–3.5)
LYMPHOCYTES NFR BLD: 47 % (ref 12–49)
MAGNESIUM SERPL-MCNC: 1.9 MG/DL (ref 1.6–2.4)
MCH RBC QN AUTO: 30.9 PG (ref 26–34)
MCHC RBC AUTO-ENTMCNC: 30.8 G/DL (ref 30–36.5)
MCV RBC AUTO: 100.2 FL (ref 80–99)
MONOCYTES # BLD: 0.7 K/UL (ref 0–1)
MONOCYTES NFR BLD: 8 % (ref 5–13)
NEUTS SEG # BLD: 3.6 K/UL (ref 1.8–8)
NEUTS SEG NFR BLD: 41 % (ref 32–75)
NITRITE UR QL STRIP.AUTO: NEGATIVE
NRBC # BLD: 0 K/UL (ref 0–0.01)
NRBC BLD-RTO: 0 PER 100 WBC
PH UR STRIP: 5 [PH] (ref 5–8)
PLATELET # BLD AUTO: 219 K/UL (ref 150–400)
PMV BLD AUTO: 10.1 FL (ref 8.9–12.9)
POTASSIUM SERPL-SCNC: 3.3 MMOL/L (ref 3.5–5.1)
PROCALCITONIN SERPL-MCNC: <0.05 NG/ML
PROT SERPL-MCNC: 7.6 G/DL (ref 6.4–8.2)
PROT UR STRIP-MCNC: NEGATIVE MG/DL
RBC # BLD AUTO: 4.24 M/UL (ref 3.8–5.2)
RBC #/AREA URNS HPF: ABNORMAL /HPF (ref 0–5)
SAMPLES BEING HELD,HOLD: NORMAL
SERVICE CMNT-IMP: ABNORMAL
SERVICE CMNT-IMP: NORMAL
SODIUM SERPL-SCNC: 143 MMOL/L (ref 136–145)
SOURCE, COVRS: NORMAL
SP GR UR REFRACTOMETRY: 1.02 (ref 1–1.03)
SPECIMEN SOURCE, FCOV2M: NORMAL
TROPONIN I SERPL-MCNC: 0.21 NG/ML
TROPONIN I SERPL-MCNC: 11.5 NG/ML
TROPONIN I SERPL-MCNC: 12.4 NG/ML
TSH SERPL DL<=0.05 MIU/L-ACNC: 1.04 UIU/ML (ref 0.36–3.74)
UA: UC IF INDICATED,UAUC: ABNORMAL
UROBILINOGEN UR QL STRIP.AUTO: 0.2 EU/DL (ref 0.2–1)
WBC # BLD AUTO: 8.7 K/UL (ref 3.6–11)
WBC URNS QL MICRO: ABNORMAL /HPF (ref 0–4)

## 2020-07-01 PROCEDURE — 87635 SARS-COV-2 COVID-19 AMP PRB: CPT

## 2020-07-01 PROCEDURE — 82728 ASSAY OF FERRITIN: CPT

## 2020-07-01 PROCEDURE — 86141 C-REACTIVE PROTEIN HS: CPT

## 2020-07-01 PROCEDURE — 99285 EMERGENCY DEPT VISIT HI MDM: CPT

## 2020-07-01 PROCEDURE — 74011000258 HC RX REV CODE- 258: Performed by: EMERGENCY MEDICINE

## 2020-07-01 PROCEDURE — 83615 LACTATE (LD) (LDH) ENZYME: CPT

## 2020-07-01 PROCEDURE — 83036 HEMOGLOBIN GLYCOSYLATED A1C: CPT

## 2020-07-01 PROCEDURE — 85025 COMPLETE CBC W/AUTO DIFF WBC: CPT

## 2020-07-01 PROCEDURE — 71045 X-RAY EXAM CHEST 1 VIEW: CPT

## 2020-07-01 PROCEDURE — 96374 THER/PROPH/DIAG INJ IV PUSH: CPT

## 2020-07-01 PROCEDURE — 81001 URINALYSIS AUTO W/SCOPE: CPT

## 2020-07-01 PROCEDURE — 84443 ASSAY THYROID STIM HORMONE: CPT

## 2020-07-01 PROCEDURE — 74011250637 HC RX REV CODE- 250/637: Performed by: HOSPITALIST

## 2020-07-01 PROCEDURE — 74011250636 HC RX REV CODE- 250/636: Performed by: EMERGENCY MEDICINE

## 2020-07-01 PROCEDURE — 36415 COLL VENOUS BLD VENIPUNCTURE: CPT

## 2020-07-01 PROCEDURE — 82962 GLUCOSE BLOOD TEST: CPT

## 2020-07-01 PROCEDURE — 82550 ASSAY OF CK (CPK): CPT

## 2020-07-01 PROCEDURE — 83880 ASSAY OF NATRIURETIC PEPTIDE: CPT

## 2020-07-01 PROCEDURE — 71275 CT ANGIOGRAPHY CHEST: CPT

## 2020-07-01 PROCEDURE — 84484 ASSAY OF TROPONIN QUANT: CPT

## 2020-07-01 PROCEDURE — 74011250636 HC RX REV CODE- 250/636: Performed by: NURSE PRACTITIONER

## 2020-07-01 PROCEDURE — 93005 ELECTROCARDIOGRAM TRACING: CPT

## 2020-07-01 PROCEDURE — 83520 IMMUNOASSAY QUANT NOS NONAB: CPT

## 2020-07-01 PROCEDURE — 94761 N-INVAS EAR/PLS OXIMETRY MLT: CPT

## 2020-07-01 PROCEDURE — 74011636320 HC RX REV CODE- 636/320: Performed by: EMERGENCY MEDICINE

## 2020-07-01 PROCEDURE — 74011250637 HC RX REV CODE- 250/637: Performed by: EMERGENCY MEDICINE

## 2020-07-01 PROCEDURE — 87086 URINE CULTURE/COLONY COUNT: CPT

## 2020-07-01 PROCEDURE — 84145 PROCALCITONIN (PCT): CPT

## 2020-07-01 PROCEDURE — 83735 ASSAY OF MAGNESIUM: CPT

## 2020-07-01 PROCEDURE — 74011250636 HC RX REV CODE- 250/636: Performed by: HOSPITALIST

## 2020-07-01 PROCEDURE — 65660000000 HC RM CCU STEPDOWN

## 2020-07-01 PROCEDURE — 85652 RBC SED RATE AUTOMATED: CPT

## 2020-07-01 PROCEDURE — 94660 CPAP INITIATION&MGMT: CPT

## 2020-07-01 PROCEDURE — 77030013038 HC MSK CPAP FISP -A

## 2020-07-01 PROCEDURE — 80053 COMPREHEN METABOLIC PANEL: CPT

## 2020-07-01 PROCEDURE — 85379 FIBRIN DEGRADATION QUANT: CPT

## 2020-07-01 RX ORDER — SODIUM CHLORIDE 0.9 % (FLUSH) 0.9 %
5-40 SYRINGE (ML) INJECTION AS NEEDED
Status: DISCONTINUED | OUTPATIENT
Start: 2020-07-01 | End: 2020-07-04 | Stop reason: HOSPADM

## 2020-07-01 RX ORDER — METHIMAZOLE 5 MG/1
5 TABLET ORAL EVERY OTHER DAY
Status: DISCONTINUED | OUTPATIENT
Start: 2020-07-02 | End: 2020-07-03

## 2020-07-01 RX ORDER — SODIUM CHLORIDE 0.9 % (FLUSH) 0.9 %
10 SYRINGE (ML) INJECTION
Status: COMPLETED | OUTPATIENT
Start: 2020-07-01 | End: 2020-07-01

## 2020-07-01 RX ORDER — FUROSEMIDE 10 MG/ML
40 INJECTION INTRAMUSCULAR; INTRAVENOUS ONCE
Status: COMPLETED | OUTPATIENT
Start: 2020-07-01 | End: 2020-07-01

## 2020-07-01 RX ORDER — GUAIFENESIN 100 MG/5ML
324 LIQUID (ML) ORAL
Status: DISCONTINUED | OUTPATIENT
Start: 2020-07-01 | End: 2020-07-01

## 2020-07-01 RX ORDER — SODIUM CHLORIDE 0.9 % (FLUSH) 0.9 %
5-40 SYRINGE (ML) INJECTION EVERY 8 HOURS
Status: DISCONTINUED | OUTPATIENT
Start: 2020-07-01 | End: 2020-07-04 | Stop reason: HOSPADM

## 2020-07-01 RX ORDER — DEXTROSE 50 % IN WATER (D50W) INTRAVENOUS SYRINGE
12.5-25 AS NEEDED
Status: DISCONTINUED | OUTPATIENT
Start: 2020-07-01 | End: 2020-07-04 | Stop reason: HOSPADM

## 2020-07-01 RX ORDER — FUROSEMIDE 10 MG/ML
40 INJECTION INTRAMUSCULAR; INTRAVENOUS 2 TIMES DAILY
Status: COMPLETED | OUTPATIENT
Start: 2020-07-01 | End: 2020-07-02

## 2020-07-01 RX ORDER — METOPROLOL TARTRATE 50 MG/1
50 TABLET ORAL 2 TIMES DAILY
Status: DISCONTINUED | OUTPATIENT
Start: 2020-07-01 | End: 2020-07-03

## 2020-07-01 RX ORDER — POTASSIUM CHLORIDE 750 MG/1
40 TABLET, FILM COATED, EXTENDED RELEASE ORAL EVERY 6 HOURS
Status: COMPLETED | OUTPATIENT
Start: 2020-07-01 | End: 2020-07-01

## 2020-07-01 RX ORDER — MAGNESIUM SULFATE 100 %
4 CRYSTALS MISCELLANEOUS AS NEEDED
Status: DISCONTINUED | OUTPATIENT
Start: 2020-07-01 | End: 2020-07-04 | Stop reason: HOSPADM

## 2020-07-01 RX ORDER — ASPIRIN 81 MG/1
81 TABLET ORAL DAILY
Status: DISCONTINUED | OUTPATIENT
Start: 2020-07-02 | End: 2020-07-04 | Stop reason: HOSPADM

## 2020-07-01 RX ORDER — INSULIN LISPRO 100 [IU]/ML
INJECTION, SOLUTION INTRAVENOUS; SUBCUTANEOUS
Status: DISCONTINUED | OUTPATIENT
Start: 2020-07-01 | End: 2020-07-04 | Stop reason: HOSPADM

## 2020-07-01 RX ORDER — HEPARIN SODIUM 5000 [USP'U]/ML
5000 INJECTION, SOLUTION INTRAVENOUS; SUBCUTANEOUS EVERY 12 HOURS
Status: DISCONTINUED | OUTPATIENT
Start: 2020-07-01 | End: 2020-07-01

## 2020-07-01 RX ORDER — ACETAMINOPHEN 325 MG/1
650 TABLET ORAL
Status: DISCONTINUED | OUTPATIENT
Start: 2020-07-01 | End: 2020-07-04 | Stop reason: HOSPADM

## 2020-07-01 RX ORDER — ATORVASTATIN CALCIUM 20 MG/1
20 TABLET, FILM COATED ORAL
Status: DISCONTINUED | OUTPATIENT
Start: 2020-07-01 | End: 2020-07-04 | Stop reason: HOSPADM

## 2020-07-01 RX ORDER — DOCUSATE SODIUM 100 MG/1
100 CAPSULE, LIQUID FILLED ORAL 2 TIMES DAILY
Status: DISCONTINUED | OUTPATIENT
Start: 2020-07-01 | End: 2020-07-04 | Stop reason: HOSPADM

## 2020-07-01 RX ORDER — INSULIN LISPRO 100 [IU]/ML
INJECTION, SOLUTION INTRAVENOUS; SUBCUTANEOUS EVERY 6 HOURS
Status: DISCONTINUED | OUTPATIENT
Start: 2020-07-01 | End: 2020-07-01

## 2020-07-01 RX ORDER — ENOXAPARIN SODIUM 100 MG/ML
40 INJECTION SUBCUTANEOUS EVERY 24 HOURS
Status: DISCONTINUED | OUTPATIENT
Start: 2020-07-01 | End: 2020-07-01 | Stop reason: CLARIF

## 2020-07-01 RX ORDER — GUAIFENESIN 100 MG/5ML
162 LIQUID (ML) ORAL
Status: COMPLETED | OUTPATIENT
Start: 2020-07-01 | End: 2020-07-01

## 2020-07-01 RX ORDER — POTASSIUM CHLORIDE 750 MG/1
20 TABLET, FILM COATED, EXTENDED RELEASE ORAL DAILY
Status: DISCONTINUED | OUTPATIENT
Start: 2020-07-02 | End: 2020-07-04 | Stop reason: HOSPADM

## 2020-07-01 RX ORDER — ONDANSETRON 2 MG/ML
4 INJECTION INTRAMUSCULAR; INTRAVENOUS
Status: DISCONTINUED | OUTPATIENT
Start: 2020-07-01 | End: 2020-07-04 | Stop reason: HOSPADM

## 2020-07-01 RX ORDER — ENOXAPARIN SODIUM 100 MG/ML
1 INJECTION SUBCUTANEOUS ONCE
Status: COMPLETED | OUTPATIENT
Start: 2020-07-01 | End: 2020-07-01

## 2020-07-01 RX ADMIN — ATORVASTATIN CALCIUM 20 MG: 20 TABLET, FILM COATED ORAL at 22:01

## 2020-07-01 RX ADMIN — CEFTRIAXONE 1 G: 1 INJECTION, POWDER, FOR SOLUTION INTRAMUSCULAR; INTRAVENOUS at 16:18

## 2020-07-01 RX ADMIN — FUROSEMIDE 40 MG: 10 INJECTION, SOLUTION INTRAMUSCULAR; INTRAVENOUS at 09:28

## 2020-07-01 RX ADMIN — AZITHROMYCIN 500 MG: 500 INJECTION, POWDER, LYOPHILIZED, FOR SOLUTION INTRAVENOUS at 17:46

## 2020-07-01 RX ADMIN — NITROGLYCERIN 0.5 INCH: 20 OINTMENT TOPICAL at 09:26

## 2020-07-01 RX ADMIN — Medication 10 ML: at 11:30

## 2020-07-01 RX ADMIN — POTASSIUM CHLORIDE 40 MEQ: 750 TABLET, FILM COATED, EXTENDED RELEASE ORAL at 19:52

## 2020-07-01 RX ADMIN — DOCUSATE SODIUM 100 MG: 100 CAPSULE, LIQUID FILLED ORAL at 17:45

## 2020-07-01 RX ADMIN — ENOXAPARIN SODIUM 60 MG: 40 INJECTION SUBCUTANEOUS at 17:47

## 2020-07-01 RX ADMIN — IOPAMIDOL 68 ML: 755 INJECTION, SOLUTION INTRAVENOUS at 11:30

## 2020-07-01 RX ADMIN — FUROSEMIDE 40 MG: 10 INJECTION, SOLUTION INTRAMUSCULAR; INTRAVENOUS at 17:45

## 2020-07-01 RX ADMIN — METOPROLOL TARTRATE 50 MG: 50 TABLET, FILM COATED ORAL at 17:45

## 2020-07-01 RX ADMIN — POTASSIUM CHLORIDE 40 MEQ: 750 TABLET, FILM COATED, EXTENDED RELEASE ORAL at 16:21

## 2020-07-01 RX ADMIN — Medication 10 ML: at 22:01

## 2020-07-01 RX ADMIN — ASPIRIN 81 MG 162 MG: 81 TABLET ORAL at 16:21

## 2020-07-01 NOTE — Clinical Note
Lesion located in the Proximal LAD. Balloon inserted. Balloon inflated using single inflation technique. Lesion #1: Pressure = 14 haydee; Duration = 25 sec.

## 2020-07-01 NOTE — ED NOTES
Assumed care of pt from triage. Pt is A&O x 4. Pt reports CC of sudden onset of CP with tightness when waking up this morning. Pt reports hx of CHF. No obvious swelling to legs or hands. Pt placed on Monitor x 3. Dr Radha Faria evaluating pt at bedside. Pt reports she is feeling better since being placed on BIPAP.     0942 Medicated pt per orders. Pt placed on purewick for comfort. 1015 Pt resting on stretcher in POC with call bell in reach. Pt remains on monitor x 3. VSS at this time. 1100 RT took pt off BIPAP and placed on NC at 6L. Pt resting on stretcher in POC and reports she feels much better    1450 TRANSFER - OUT REPORT:    Verbal report given to Martinez(name) on Amado Omalley  being transferred to PCU(unit) for routine progression of care       Report consisted of patients Situation, Background, Assessment and   Recommendations(SBAR). Information from the following report(s) SBAR, ED Summary and Recent Results was reviewed with the receiving nurse. Lines:   Peripheral IV 20 Left Antecubital (Active)   Site Assessment Clean, dry, & intact 2020  9:17 AM   Phlebitis Assessment 0 2020  9:17 AM   Infiltration Assessment 0 2020  9:17 AM   Dressing Status Clean, dry, & intact 2020  9:17 AM   Hub Color/Line Status Green;Flushed 2020  9:17 AM       Peripheral IV 20 Right Antecubital (Active)   Site Assessment Clean, dry, & intact 2020  9:17 AM   Phlebitis Assessment 0 2020  9:17 AM   Infiltration Assessment 0 2020  9:17 AM   Dressing Status Clean, dry, & intact 2020  9:17 AM   Hub Color/Line Status Pink 2020  9:17 AM   Action Taken Blood drawn 2020  9:17 AM        Opportunity for questions and clarification was provided.       Patient transported with:   Monitor  Registered Nurse

## 2020-07-01 NOTE — Clinical Note
Lesion: Located in the Mid LAD. Stent inserted. Stent deployed. First inflation pressure = 16 haydee; inflation time: 25 sec.

## 2020-07-01 NOTE — Clinical Note
TRANSFER - OUT REPORT:     Verbal report given to: Alan Art. Report consisted of patient's Situation, Background, Assessment and   Recommendations(SBAR). Opportunity for questions and clarification was provided. Patient transported with a Registered Nurse. Patient transported to: IVCU.

## 2020-07-01 NOTE — Clinical Note
Lesion located in the Proximal LAD. Balloon inserted. Balloon inflated using multiple inflations inflation technique. Lesion #1: Pressure = 18 haydee; Duration = 10 sec. Inflation 2: Pressure = 18 haydee; Duration = 10 sec. Graft Donor Site Bandage (Optional-Leave Blank If You Don't Want In Note): Steri-strips and a pressure bandage were applied to the donor site.

## 2020-07-01 NOTE — H&P
Hospitalist Admission Note    NAME: Coni Anders   :  1932   MRN:  [id number]     Date/Time:  2020 2:10 PM    Patient PCP: Korin Silverio MD   Cardiology: Dr. Heriberto Chavira  ______________________________________________________________________   Assessment & Plan:  Acute hypoxic respiratory failure, present on admission due to  Acute pulmonary edema with diffuse bilateral opacities. CTA of the chest negative for PE but may also be consistent with viral infection. Patient has rapidly improved with IV lasix and bipap in ER and now on 4L O2. Procalcitonin <0.05 so very low suspicion for bacteria pneumonia. Possible NSTEMI with elevated trop 0.21 and chest pain earlier, POA. Hx CAD with prior MI in  and stents per patient. No current CP  --admit to stepdown  --rapid SARS-COV-2 test sent. --continue O2 and wean down. Bipap prn if develop respiratory distress  --diurese with IV lasix. --serial troponin   --continue nitropaste  --took aspirin 81mg at home already. Give 162mg now and then resume 81mg daily  --resume metoprol, zocor. Hold amlodipine and lisinopril to allow BP room for diuresis  --cardiology consult  --check fasting lipid panel  --ER physician giving azithromycin and rocephin to cover for possible PNA. Do not plan to continue after today. Repeat CXR ordered tomorrow    Acute hyperglycemia  concerning for DM vs. Stress reaction  --put on diabetic cardiac diet  --check A1c  --add low dose SSI  --check UA    Hyperthyroidism  --continue methimazole. Check TSH    Hx frequent fall  --fall precaution    Body mass index is 21.46 kg/m². Code: discussed, wants full code  DVT prophylaxis: lovenox  Surrogate decision maker:  Pilo Fraga    Addendum:  UA c/w uti, POA. Continue rocephin. Add on urine culture. Repeat trop 11 c/w NSTEMI, POA. Give lovenox 1mg/kg x 1. Notified cardiology, spoke to Derrick Alves NP.         Subjective:   CHIEF COMPLAINT:  SOB, chest pain    HISTORY OF PRESENT ILLNESS:     Osvaldo Garsia is a 80 y.o. female with PMH HTN, hx CAD with prior MI and stent in 2008 per patient brought in to ER by son for SOB. Patient's O2 sat was 53% RA and she was placed on bipap. She reports onset of SOB this morning after waking up and getting ready for appointment with endocrinologist, associated with nonproductive cough and lower mid sternal and epigastric chest pain, had nausea and vomited a small amount x 1, with sweats. Pain was sharp but mild. Chest pain has resolved. She was noted to have JVD and diffuse crackles. Given lasix 40mg IV x 1 and nitropaste. She has been voiding well and now feels much better, has been taken off bipap prior to my evaluation. We were asked to admit for work up and evaluation of the above problems. Past Medical History:   Diagnosis Date    Abnormality of gait     Essential hypertension, benign     Old myocardial infarction     Other acute and subacute form of ischemic heart disease     Peripheral vascular disease, unspecified ([facility] [address]       Past Surgical History:   Procedure Laterality Date    HX BREAST BIOPSY Bilateral     x2 benign 10+ years ago     Social History     Tobacco Use    Smoking status: Never Smoker    Smokeless tobacco: Never Used   Substance Use Topics    Alcohol use: No      Lives alone, does not drive due to poor eyesight. Walks independently, cooks, clean. Has frequent fall per patient. History reviewed. Family history:  +HTN  No Known Allergies     Prior to Admission medications    Medication Sig Start Date End Date Taking? Authorizing Provider   amLODIPine (NORVASC) 2.5 mg tablet Take 1 Tab by mouth daily. 2/11/20  Yes Misha Joseph MD   lisinopril (PRINIVIL, ZESTRIL) 20 mg tablet Take 20 mg by mouth daily. 8/5/19  Yes Provider, Historical   cholecalciferol, vitamin D3, (VITAMIN D3) 2,000 unit Tab Take 1 Cap by mouth daily.    Yes Provider, Historical   omega-3 fatty acids-vitamin e (FISH OIL) 1,000 mg Cap Take 2 Caps by mouth. Yes Provider, Historical   aspirin 81 mg tablet Take 81 mg by mouth daily. Yes Provider, Historical   methimazole (TAPAZOLE) 5 mg tablet Take 5 mg by mouth every other day. Yes Provider, Historical   metoprolol (LOPRESSOR) 50 mg tablet Take 50 mg by mouth two (2) times a day. Yes Provider, Historical   simvastatin (ZOCOR) 40 mg tablet Take 40 mg by mouth nightly. Yes Provider, Historical   multivitamin (ONE A DAY) tablet Take 1 Tab by mouth daily. Provider, Historical   cyanocobalamin (VITAMIN B-12) 1,000 mcg tablet Take 1,000 mcg by mouth daily. Provider, Historical   valACYclovir (VALTREX) 500 mg tablet Take 500 mg by mouth as needed. Provider, Historical     REVIEW OF SYSTEMS:  POSITIVE= Bold. Negative = normal text  General:  fever, chills, sweats, generalized weakness, weight loss/gain, loss of appetite  Eyes:  blurred vision, eye pain, loss of vision, diplopia  Ear Nose and Throat:  rhinorrhea, pharyngitis  Respiratory:   cough, sputum production, SOB, wheezing, CORTES, pleuritic pain  Cardiology:  chest pain, palpitations, orthopnea, PND, edema, syncope   Gastrointestinal:  abdominal pain, N/V, dysphagia, diarrhea, constipation, bleeding  Genitourinary:  frequency, urgency, dysuria, hematuria, incontinence  Muskuloskeletal :  arthralgia, myalgia  Hematology:  easy bruising, bleeding, lymphadenopathy  Dermatological:  rash, ulceration, pruritis  Endocrine:  hot flashes or polydipsia  Neurological:  headache, dizziness, confusion, focal weakness, paresthesia, memory loss, gait disturbance  Psychological: anxiety, depression, agitation      Objective:   VITALS:    Visit Vitals  /78   Pulse 92   Resp 23   Wt 56.7 kg (125 lb)   SpO2 95%   BMI 21.46 kg/m²     No data recorded. Body mass index is 21.46 kg/m². PHYSICAL EXAM:    General:    Alert, elderly female, oriented x 3, cooperative, no distress, appears stated age. HEENT: Atraumatic, anicteric sclerae, pink conjunctivae     No oral ulcers, mucosa moist, throat clear. Hearing intact. Neck:  Supple, symmetrical,  thyroid: non tender. No goiter appreciated. +JVD  Lungs:   Diffuse crackles. No Wheezing or Rhonchi. Chest wall:  No tenderness  No Accessory muscle use. Heart:   Regular  rhythm,  No  murmur   No gallop. Trace pitting edema ankles b/l  Abdomen:   Soft, non-tender. Not distended. Bowel sounds normal. No masses  Extremities: No cyanosis. No clubbing  Skin:     Not pale Not Jaundiced  No rashes   Psych:  Good insight. Not depressed. Not anxious or agitated. Neurologic: EOMs intact. No facial asymmetry. No aphasia or slurred speech. Symmetrical strength, Alert and oriented X 3. Peripheral pulse: Bilateral, DP, 2+  Capillary refill:  normal    IMAGING RESULTS:   [x]       I have personally reviewed the actual   [x]     CXR  []     CT scan  CXR: cardiomegaly, extensive diffuse opacities  CTA chest  Negative for pulmonary embolus. 2. Mild bilateral pleural effusions, greater on the right. 3. Extensive bilateral airspace disease. While significant edema and atelectasis  could have this appearance, findings are worrisome for infection. A viral  infection could have this appearance. EKG:reviewed sinus tach 116, nonspecific T changes ________________________________________________________________________  Care Plan discussed with:    Comments   Patient y    Family  y Son Jayda Lange at bedside. Patient's purse was given to son to bring home.    RN     Care Manager                    Consultant:      ________________________________________________________________________  Prophylaxis:  GI none   DVT lovenox   ________________________________________________________________________  Recommended Disposition:   Home with Family y   HH/PT/OT/RN y   SNF/LTC    [facility]    ________________________________________________________________________  Code Status:  Full Code y   DNR/DNI    ________________________________________________________________________  TOTAL TIME:  65 minutes    ______________________________________________________________________  Alexx Galloway MD      Procedures: see electronic medical records for all procedures/Xrays and details which were not copied into this note but were reviewed prior to creation of Plan. LAB DATA REVIEWED:    Recent Results (from the past 24 hour(s))   EKG, 12 LEAD, INITIAL    Collection Time: 07/01/20  9:00 AM   Result Value Ref Range    Ventricular Rate 116 BPM    Atrial Rate 116 BPM    P-R Interval 170 ms    QRS Duration 88 ms    Q-T Interval 354 ms    QTC Calculation (Bezet) 492 ms    Calculated P Axis 75 degrees    Calculated R Axis 2 degrees    Calculated T Axis 75 degrees    Diagnosis       Sinus tachycardia with premature supraventricular complexes  Nonspecific T wave abnormality  No previous ECGs available     CBC WITH AUTOMATED DIFF    Collection Time: 07/01/20  9:18 AM   Result Value Ref Range    WBC 8.7 3.6 - 11.0 K/uL    RBC 4.24 3.80 - 5.20 M/uL    HGB 13.1 11.5 - 16.0 g/dL    HCT 42.5 35.0 - 47.0 %    .2 (H) 80.0 - 99.0 FL    MCH 30.9 26.0 - 34.0 PG    MCHC 30.8 30.0 - 36.5 g/dL    RDW 13.5 11.5 - 14.5 %    PLATELET 749 936 - 107 K/uL    MPV 10.1 8.9 - 12.9 FL    NRBC 0.0 0  WBC    ABSOLUTE NRBC 0.00 0.00 - 0.01 K/uL    NEUTROPHILS 41 32 - 75 %    LYMPHOCYTES 47 12 - 49 %    MONOCYTES 8 5 - 13 %    EOSINOPHILS 3 0 - 7 %    BASOPHILS 1 0 - 1 %    IMMATURE GRANULOCYTES 0 0.0 - 0.5 %    ABS. NEUTROPHILS 3.6 1.8 - 8.0 K/UL    ABS. LYMPHOCYTES 4.1 (H) 0.8 - 3.5 K/UL    ABS. MONOCYTES 0.7 0.0 - 1.0 K/UL    ABS. EOSINOPHILS 0.3 0.0 - 0.4 K/UL    ABS. BASOPHILS 0.1 0.0 - 0.1 K/UL    ABS. IMM.  GRANS. 0.0 0.00 - 0.04 K/UL    DF AUTOMATED     D DIMER    Collection Time: 07/01/20  9:18 AM   Result Value Ref Range    D-dimer 3.26 (H) 0.00 - 0.65 mg/L FEU   METABOLIC PANEL, COMPREHENSIVE    Collection Time: 07/01/20  9:18 AM   Result Value Ref Range    Sodium 143 136 - 145 mmol/L    Potassium 3.3 (L) 3.5 - 5.1 mmol/L    Chloride 110 (H) 97 - 108 mmol/L    CO2 23 21 - 32 mmol/L    Anion gap 10 5 - 15 mmol/L    Glucose 367 (H) 65 - 100 mg/dL    BUN 20 6 - 20 MG/DL    Creatinine 1.09 (H) 0.55 - 1.02 MG/DL    BUN/Creatinine ratio 18 12 - 20      GFR est AA 58 (L) >60 ml/min/1.73m2    GFR est non-AA 47 (L) >60 ml/min/1.73m2    Calcium 8.6 8.5 - 10.1 MG/DL    Bilirubin, total 0.7 0.2 - 1.0 MG/DL    ALT (SGPT) 38 12 - 78 U/L    AST (SGOT) 24 15 - 37 U/L    Alk. phosphatase 90 45 - 117 U/L    Protein, total 7.6 6.4 - 8.2 g/dL    Albumin 3.2 (L) 3.5 - 5.0 g/dL    Globulin 4.4 (H) 2.0 - 4.0 g/dL    A-G Ratio 0.7 (L) 1.1 - 2.2     NT-PRO BNP    Collection Time: 07/01/20  9:18 AM   Result Value Ref Range    NT pro-BNP 1,656 (H) <450 PG/ML   TROPONIN I    Collection Time: 07/01/20  9:18 AM   Result Value Ref Range    Troponin-I, Qt. 0.21 (H) <0.05 ng/mL   PROCALCITONIN    Collection Time: 07/01/20  9:18 AM   Result Value Ref Range    Procalcitonin <0.05 ng/mL   SAMPLES BEING HELD    Collection Time: 07/01/20  9:18 AM   Result Value Ref Range    SAMPLES BEING HELD  PST, LAV, BLUE, BLD CULTURE     COMMENT        Add-on orders for these samples will be processed based on acceptable specimen integrity and analyte stability, which may vary by analyte.    CK W/ CKMB & INDEX    Collection Time: 07/01/20  9:18 AM   Result Value Ref Range    CK - MB 2.6 <3.6 NG/ML    CK-MB Index 2.4 0.0 - 2.5       26 - 192 U/L

## 2020-07-01 NOTE — Clinical Note
Lesion located in the Mid LAD. Balloon inserted. Lesion #1: Pressure = 12 haydee; Duration = 15 sec.

## 2020-07-01 NOTE — CONSULTS
[address] [facility]     Date of  Admission: 7/1/2020  8:52 AM     Admission type:Emergency    Consult for: hf, trop  Consult by: ED     Subjective:     Miryam Sommer is a 80 y.o. female with PMH PVD, MI, HTN, CAD, HLD, angina who was admitted for Acute respiratory failure with hypoxia ([facility] [address] [J96.01]  Pulmonary edema [J81.1]  NSTEMI (non-ST elevated myocardial infarction) ([facility] [address] [I21.4]. Per ED provider note Miryam Sommer presented to the ED with c/o acute SOB and chest tightness. She was severely hypoxic and placed on bipap, given lasix and nitro paste. Cardiology consulted for heart failure and troponin of 0.21. On assessment, Miryam Sommer states that she was \"rushing\" to get ready for a doctor's appointment and she started having chest pain in her epigastric region. She normally has some angina in the mornings, but this was more severe. She then became very SOB. Prior to this morning, Ms. Collin Molina was feeling in her usual state of health. She is now feeling much better. She denies any current chest/epigastric pain and her SOB is much improved. She denies fevers/chills. She had a cough this morning. Has slight leg swelling, but this is around her usual amount. Discussed with patient over the phone while viewing patient through ED glass doors. Miryam Sommer  follows with Dr. Molly Chi for cardiology. Last ECHO not in electronic record. Cath not in electronic record. Stress 02/14 abnormal, but felt to be similar to prior studies.      Cardiac risk factors: dyslipidemia, hypertension, post-menopausal.    Patient Active Problem List    Diagnosis Date Noted    Pulmonary edema 07/01/2020    NSTEMI (non-ST elevated myocardial infarction) ([facility] [address] 07/01/2020    Acute respiratory failure with hypoxia (HCC) 07/01/2020    SOB (shortness of breath) on exertion 05/19/2015    Postsurgical percutaneous transluminal coronary angioplasty status 04/27/2012    Coronary atherosclerosis of native coronary artery 04/27/2012    Acute myocardial infarction of other inferior wall, subsequent episode of care 04/27/2012    Mixed hyperlipidemia 04/27/2012      Yenny Tovar MD  Past Medical History:   Diagnosis Date    Abnormality of gait     Essential hypertension, benign     Old myocardial infarction     Other acute and subacute form of ischemic heart disease     Peripheral vascular disease, unspecified ([facility] [address]       Social History     Socioeconomic History    Marital status: SINGLE     Spouse name: Not on file    Number of children: Not on file    Years of education: Not on file    Highest education level: Not on file   Tobacco Use    Smoking status: Never Smoker    Smokeless tobacco: Never Used   Substance and Sexual Activity    Alcohol use: No    Drug use: No     No Known Allergies   History reviewed. No pertinent family history. Current Facility-Administered Medications   Medication Dose Route Frequency    aspirin chewable tablet 324 mg  324 mg Oral NOW    [START ON 7/2/2020] aspirin delayed-release tablet 81 mg  81 mg Oral DAILY     Current Outpatient Medications   Medication Sig    amLODIPine (NORVASC) 2.5 mg tablet Take 1 Tab by mouth daily.  lisinopril (PRINIVIL, ZESTRIL) 20 mg tablet Take 20 mg by mouth daily.  multivitamin (ONE A DAY) tablet Take 1 Tab by mouth daily.  cyanocobalamin (VITAMIN B-12) 1,000 mcg tablet Take 1,000 mcg by mouth daily.  valACYclovir (VALTREX) 500 mg tablet Take 500 mg by mouth as needed.  cholecalciferol, vitamin D3, (VITAMIN D3) 2,000 unit Tab Take 1 Cap by mouth daily.  omega-3 fatty acids-vitamin e (FISH OIL) 1,000 mg Cap Take 2 Caps by mouth.  aspirin 81 mg tablet Take 81 mg by mouth daily.  lisinopril-hydrochlorothiazide (PRINZIDE, ZESTORETIC) 20-12.5 mg per tablet Take 1 Tab by mouth daily.  methimazole (TAPAZOLE) 5 mg tablet Take 5 mg by mouth every other day.     metoprolol (LOPRESSOR) 50 mg tablet Take 50 mg by mouth two (2) times a day.  simvastatin (ZOCOR) 40 mg tablet Take 40 mg by mouth nightly. Review of Symptoms:   11 systems reviewed, negative other than as stated in the HPI        Objective:      Visit Vitals  /78   Pulse 92   Resp 23   Wt 56.7 kg (125 lb)   SpO2 95%   BMI 21.46 kg/m²       Physical:   General: pleasant, elderly AAF resting on stretcher in NAD  Heart:   Lungs: speaking in full sentences. Unlabored. Abdomen:   Extremities:   Neurologic: Grossly normal    **bedside exam deferred due to covid rule out isolation. Patient viewed through ED glass and door and assessed over the phone**      Data Review:   Recent Labs     07/01/20 0918   WBC 8.7   HGB 13.1   HCT 42.5        Recent Labs     07/01/20 0918      K 3.3*   *   CO2 23   *   BUN 20   CREA 1.09*   CA 8.6   ALB 3.2*   TBILI 0.7   ALT 38       Recent Labs     07/01/20 0918   TROIQ 0.21*       No intake or output data in the 24 hours ending 07/01/20 1332     Cardiographics    Telemetry: SR  ECG: ST;  Flattened t waves lateral leads  Echocardiogram: ordered   CXRAY: \"Findings suggestive of congestive change. Developing infiltration cannot be excluded. \"  CTA chest: \"1. Negative for pulmonary embolus. 2. Mild bilateral pleural effusions, greater on the right. 3. Extensive bilateral airspace disease. While significant edema and atelectasis  could have this appearance, findings are worrisome for infection. A viral  infection could have this appearance. \"       Assessment:       Active Problems:    Pulmonary edema (7/1/2020)      NSTEMI (non-ST elevated myocardial infarction) ([facility] [address] (7/1/2020)      Acute respiratory failure with hypoxia ([facility] [address] (7/1/2020)         Plan:       Olive Garcia is a 80 y.o. female who presented to the ED with c/o acute SOB and chest tightness. Found to be hypoxic with sats in the 46s.   D-dimer 3.26, so CTA chest was ordered in ED, which showed bilateral airspace disease concerning for either significant edema or viral infection. K 3.3; Troponin 0.21; proBNP 1656. ekg with flat t waves in lateral leads  · ROS appears to be flash pulmonary edema after having chest pain  · covid rule out started in ED and hospitalist will continue. meds per hospitalist.    · Received bipap, IV lasix, and nitropaste in ED with improvement in breathing allowing patient to go on nasal cannula. Has no active chest pain. · BID lasix  · ECHO  · Trend troponin to rule out nstemi   · Continue BB for HTN, suspected HF, and CAD risk/history  · Continue ACEi for HTN, suspected HF. Would hold off on amlodipine and HCTZ while getting lasix unless hypertensive  · ASA, statin, BB for CAD history/risk  · Further orders and recommendations based on test results and clinical course    Thank you for consulting [id number], FAITH  DNP, RN, AGACNP-BC      Patient seen and examined by me with nurse practitioner. I personally performed all components of the history, physical, and medical decision making and agree with the assessment and plan as noted. Follow enzyme. If troponin elevates significantly then add sq lovenox.      Terresa Goldberg, MD

## 2020-07-01 NOTE — PROGRESS NOTES
Pharmacy - Enoxaparin (Lovenox®) Monitoring      Indication: VTE px     Current Dose: Enoxaparin 40 mg subcutaneously every 24 hours    Creatinine Clearance (mL/min): 32    Current Weight: 56.7 Kg    Labs:  Recent Labs     07/01/20  0918   CREA 1.09*   HGB 13.1        Wt Readings from Last 1 Encounters:   07/01/20 56.7 kg (125 lb)     Ht Readings from Last 1 Encounters:   02/11/20 162.6 cm (64\")       Impression/Plan:   Due to weight < 60kg, will change enoxaparin to heparin 5000 units SQ q12h, per protocol       Thanks,  Tunde Medina      [email or web address]. pdf

## 2020-07-01 NOTE — PROGRESS NOTES
[address] [facility] ED for report, was placed on hold with no answer. Will call back for report. 1450- Report received from ED nurse [facility], RN by oncoming nurse Carrie Perdomo, [facility].    8428- Pt arrived from ER, VSS and hooked up to tele box. Pt complains of no pain and states she feels much better than this morning . O2 dropped from 6L to 3L and tolerated well.      1910- Report given to oncoming nurse Herberth Clinton, RN by offgoing nurse, Carrie Perdomo RN

## 2020-07-01 NOTE — Clinical Note
Lesion: Located in the Proximal LAD. Stent inserted. Stent deployed. First inflation pressure = 16 haydee; inflation time: 20 sec.

## 2020-07-01 NOTE — ED PROVIDER NOTES
EMERGENCY DEPARTMENT HISTORY AND PHYSICAL EXAM      Date: 7/1/2020  Patient Name: Viviana Morin  Patient Age and Sex: 80 y.o. female    History of Presenting Illness     Chief Complaint   Patient presents with    Shortness of Breath     sudden onset this morning with chest tightness       History Provided By: Patient    Ability to gather history was limited by: Dyspnea    HPI: Viviana Morin, 80 y.o. female brought to the emergency department for shortness of breath. Symptoms seem to be severe. History is limited by her shortness of breath and dyspnea, inability to speak full sentences. She denies any fever or chest pain. Sounds as though she had sudden onset over the past few hours, with chest tightness, no reported wheezing. She denies history of CHF. Location:    Quality:      Severity:    Duration:   Timing:      Context:    Modifying factors:   Associated symptoms:       The patient's medical, surgical, family, and social history on file were reviewed by me today. Past Medical History:   Diagnosis Date    Abnormality of gait     Essential hypertension, benign     Old myocardial infarction     Other acute and subacute form of ischemic heart disease     Peripheral vascular disease, unspecified ([address]      Past Surgical History:   Procedure Laterality Date    HX BREAST BIOPSY Bilateral     x2 benign 10+ years ago       PCP: Ren Callahan MD    Past History     Past Medical History:  Past Medical History:   Diagnosis Date    Abnormality of gait     Essential hypertension, benign     Old myocardial infarction     Other acute and subacute form of ischemic heart disease     Peripheral vascular disease, unspecified ([address]        Past Surgical History:  Past Surgical History:   Procedure Laterality Date    HX BREAST BIOPSY Bilateral     x2 benign 10+ years ago       Family History:  No family history on file.     Social History:  Social History     Tobacco Use    Smoking status: Never Smoker    Smokeless tobacco: Never Used   Substance Use Topics    Alcohol use: No    Drug use: No       Allergies:  No Known Allergies    Current Medications:  No current facility-administered medications on file prior to encounter. Current Outpatient Medications on File Prior to Encounter   Medication Sig Dispense Refill    amLODIPine (NORVASC) 2.5 mg tablet Take 1 Tab by mouth daily. 90 Tab 3    lisinopril (PRINIVIL, ZESTRIL) 20 mg tablet Take 20 mg by mouth daily.  multivitamin (ONE A DAY) tablet Take 1 Tab by mouth daily.  cyanocobalamin (VITAMIN B-12) 1,000 mcg tablet Take 1,000 mcg by mouth daily.  valACYclovir (VALTREX) 500 mg tablet Take 500 mg by mouth as needed.  cholecalciferol, vitamin D3, (VITAMIN D3) 2,000 unit Tab Take 1 Cap by mouth daily.  omega-3 fatty acids-vitamin e (FISH OIL) 1,000 mg Cap Take 2 Caps by mouth.  aspirin 81 mg tablet Take 81 mg by mouth daily.  lisinopril-hydrochlorothiazide (PRINZIDE, ZESTORETIC) 20-12.5 mg per tablet Take 1 Tab by mouth daily.  methimazole (TAPAZOLE) 5 mg tablet Take 5 mg by mouth every other day.  metoprolol (LOPRESSOR) 50 mg tablet Take 50 mg by mouth two (2) times a day.  simvastatin (ZOCOR) 40 mg tablet Take 40 mg by mouth nightly. Review of Systems   Review of Systems   Constitutional: Negative for fever. Respiratory: Positive for chest tightness and shortness of breath. Negative for wheezing. Cardiovascular: Negative for chest pain and leg swelling. Gastrointestinal: Negative for abdominal pain. All other systems reviewed and are negative. Physical Exam   Vital Signs  Patient Vitals for the past 8 hrs:   Pulse Resp BP SpO2   07/01/20 0912    96 %   07/01/20 0853 (!) 113 (!) 33 146/79 (!) 53 %          Physical Exam  Vitals signs and nursing note reviewed. Constitutional:       General: She is in acute distress. Appearance: Normal appearance. She is well-developed. She is ill-appearing. HENT:      Head: Normocephalic and atraumatic. Mouth/Throat:      Mouth: Mucous membranes are moist.   Eyes:      General:         Right eye: No discharge. Left eye: No discharge. Conjunctiva/sclera: Conjunctivae normal.   Neck:      Musculoskeletal: Normal range of motion and neck supple. Vascular: JVD present. Comments: Marked JVD bilaterally  Cardiovascular:      Rate and Rhythm: Regular rhythm. Tachycardia present. Heart sounds: Normal heart sounds. No murmur. Pulmonary:      Effort: Pulmonary effort is normal. No respiratory distress. Breath sounds: Rales ( Bilateral) present. No wheezing. Abdominal:      General: There is no distension. Palpations: Abdomen is soft. Tenderness: There is no abdominal tenderness. Musculoskeletal: Normal range of motion. General: No deformity. Right lower leg: No edema. Left lower leg: No edema. Skin:     General: Skin is warm and dry. Findings: No rash. Neurological:      General: No focal deficit present. Mental Status: She is alert and oriented to person, place, and time.    Psychiatric:         Speech: Speech normal.         Behavior: Behavior normal.         Cognition and Memory: Cognition normal.         Diagnostic Study Results   Labs  Recent Results (from the past 24 hour(s))   EKG, 12 LEAD, INITIAL    Collection Time: 07/01/20  9:00 AM   Result Value Ref Range    Ventricular Rate 116 BPM    Atrial Rate 116 BPM    P-R Interval 170 ms    QRS Duration 88 ms    Q-T Interval 354 ms    QTC Calculation (Bezet) 492 ms    Calculated P Axis 75 degrees    Calculated R Axis 2 degrees    Calculated T Axis 75 degrees    Diagnosis       Sinus tachycardia with premature supraventricular complexes  Nonspecific T wave abnormality  No previous ECGs available         Radiologic Studies  XR CHEST PORT    (Results Pending)     CT Results  (Last 48 hours)    None        CXR Results (Last 48 hours)    None          Procedures   EKG  Date/Time: 7/1/2020 9:17 AM  Performed by: Aline Mora MD  Authorized by: Aline Mora MD     ECG reviewed by ED Physician in the absence of a cardiologist: yes    Interpretation:     Interpretation: non-specific    Quality:     Tracing quality:  Limited by artifact  Rate:     ECG rate assessment: tachycardic    Rhythm:     Rhythm: sinus rhythm and sinus tachycardia    Ectopy:     Ectopy: none    QRS:     QRS axis:  Normal  ST segments:     ST segments:  Normal  T waves:     T waves: normal      CRITICAL CARE (ASAP ONLY)  Performed by: Aline Mora MD  Authorized by: Aline Mora MD     Critical care provider statement:     Critical care time (minutes):  40    Critical care time was exclusive of:  Separately billable procedures and treating other patients    Critical care was necessary to treat or prevent imminent or life-threatening deterioration of the following conditions:  Respiratory failure and cardiac failure    Critical care was time spent personally by me on the following activities:  Ordering and review of laboratory studies, ordering and review of radiographic studies, pulse oximetry, re-evaluation of patient's condition, examination of patient, evaluation of patient's response to treatment, ordering and performing treatments and interventions, review of old charts and discussions with consultants          Medical Decision Making     I reviewed the patient's most recent Emergency Dept notes and diagnostic tests  in formulating my MDM on today's visit. Provider Notes (Medical Decision Making):   58-year-old female with no significant CHF or COPD history, presenting with sudden onset shortness of breath over the last few hours, found to be very dyspneic, unable to speak full sentences. On exam she has marked JVD bilaterally, rales bilaterally.   She was extremely hypoxic on arrival, 53% on room air, tachycardic. BiPAP was immediately applied, intravenous Lasix was administered. She is not significantly hypertensive, and does not have much room for nitroglycerin infusion. We will give a trial of half inch nitroglycerin ointment. Chest x-ray and laboratories pending. Most likely this represents pulmonary edema from CHF, could represent pleural effusions, pneumonia, or pulmonary embolism or ACS/myocardial infarction. Shalom Fisher MD  9:15 AM      Patient is noted to have elevated BNP and troponin. D-dimer is notably elevated, in combination with hypoxia and tachycardia, pulmonary embolism was suspected. I obtain CTA imaging. There is no pulmonary embolism, however patient has bilateral diffuse infiltrates, concerning for pulmonary edema and or diffuse infectious process, possibly coronavirus/COVID-19. Further coronavirus tests were sent, currently pending. Patient was covered empirically with ceftriaxone and azithromycin. I consulted cardiology about her elevated BNP and troponin, we agree to defer anticoagulation at this time, will simply trend troponins for now. May simply be cardiac strain in the setting of acute infection. There is no clear evidence at this time the patient has sepsis or septic endorgan dysfunction.     Shalom Fisher MD  2:54 PM        Social History     Tobacco Use    Smoking status: Never Smoker    Smokeless tobacco: Never Used   Substance Use Topics    Alcohol use: No    Drug use: No     Patient Vitals for the past 4 hrs:   Pulse Resp BP SpO2   07/01/20 0912    96 %   07/01/20 0853 (!) 113 (!) 33 146/79 (!) 53 %            Consults:      Medications Administered during ED course:  Medications   nitroglycerin (NITROBID) 2 % ointment 0.5 Inch (has no administration in time range)   furosemide (LASIX) injection 40 mg (has no administration in time range)          Current Discharge Medication List             Diagnosis and Disposition Disposition:      Clinical Impression: No diagnosis found. Attestation:  I personally performed the services described in this documentation on this date 7/1/2020 for patient Robert Alejo. Rand Chaves MD        I was the first provider for this patient on this visit. To the best of my ability I reviewed relevant prior medical records, electrocardiograms, laboratories, and radiologic studies. The patient's presenting problems were discussed, and the patient was in agreement with the care plan formulated and outlined with them. Rand Chaves MD    Please note that this dictation was completed with Dragon voice recognition software. Quite often unanticipated grammatical, syntax, homophones, and other interpretive errors are inadvertently transcribed by the computer software. Please disregard these errors and excuse any errors that have escaped final proofreading.

## 2020-07-01 NOTE — Clinical Note
Lesion located in the Proximal LAD. Balloon inflated using single inflation technique. Lesion #1: Pressure = 12 haydee; Duration = 20 sec.

## 2020-07-01 NOTE — Clinical Note
Lesion: Located in the Mid Cx. Stent inserted. Stent deployed. Single technique used. First inflation pressure = 14 haydee; inflation time: 20 sec.

## 2020-07-01 NOTE — Clinical Note
Lesion located in the Proximal LAD. Balloon inserted. Balloon inflated using single inflation technique. Lesion #1: Pressure = 20 haydee; Duration = 45 sec.

## 2020-07-02 ENCOUNTER — APPOINTMENT (OUTPATIENT)
Dept: NON INVASIVE DIAGNOSTICS | Age: 85
DRG: 246 | End: 2020-07-02
Attending: HOSPITALIST
Payer: MEDICARE

## 2020-07-02 ENCOUNTER — APPOINTMENT (OUTPATIENT)
Dept: GENERAL RADIOLOGY | Age: 85
DRG: 246 | End: 2020-07-02
Attending: HOSPITALIST
Payer: MEDICARE

## 2020-07-02 PROBLEM — Z98.890 S/P CARDIAC CATH: Status: ACTIVE | Noted: 2020-07-02

## 2020-07-02 LAB
ANION GAP SERPL CALC-SCNC: 7 MMOL/L (ref 5–15)
ATRIAL RATE: 116 BPM
BUN SERPL-MCNC: 24 MG/DL (ref 6–20)
BUN/CREAT SERPL: 29 (ref 12–20)
CALCIUM SERPL-MCNC: 9.1 MG/DL (ref 8.5–10.1)
CALCULATED P AXIS, ECG09: 75 DEGREES
CALCULATED R AXIS, ECG10: 2 DEGREES
CALCULATED T AXIS, ECG11: 75 DEGREES
CHLORIDE SERPL-SCNC: 109 MMOL/L (ref 97–108)
CHOLEST SERPL-MCNC: 120 MG/DL
CO2 SERPL-SCNC: 26 MMOL/L (ref 21–32)
CREAT SERPL-MCNC: 0.82 MG/DL (ref 0.55–1.02)
DIAGNOSIS, 93000: NORMAL
ERYTHROCYTE [DISTWIDTH] IN BLOOD BY AUTOMATED COUNT: 13.7 % (ref 11.5–14.5)
GLUCOSE BLD STRIP.AUTO-MCNC: 109 MG/DL (ref 65–100)
GLUCOSE BLD STRIP.AUTO-MCNC: 113 MG/DL (ref 65–100)
GLUCOSE BLD STRIP.AUTO-MCNC: 89 MG/DL (ref 65–100)
GLUCOSE BLD STRIP.AUTO-MCNC: 95 MG/DL (ref 65–100)
GLUCOSE SERPL-MCNC: 90 MG/DL (ref 65–100)
HCT VFR BLD AUTO: 40.5 % (ref 35–47)
HDLC SERPL-MCNC: 54 MG/DL
HDLC SERPL: 2.2 {RATIO} (ref 0–5)
HGB BLD-MCNC: 12.6 G/DL (ref 11.5–16)
IL6 SERPL-MCNC: 131.5 PG/ML (ref 0–15.5)
LDLC SERPL CALC-MCNC: 53.8 MG/DL (ref 0–100)
LIPID PROFILE,FLP: NORMAL
MCH RBC QN AUTO: 30.7 PG (ref 26–34)
MCHC RBC AUTO-ENTMCNC: 31.1 G/DL (ref 30–36.5)
MCV RBC AUTO: 98.8 FL (ref 80–99)
NRBC # BLD: 0 K/UL (ref 0–0.01)
NRBC BLD-RTO: 0 PER 100 WBC
P-R INTERVAL, ECG05: 170 MS
PLATELET # BLD AUTO: 186 K/UL (ref 150–400)
PMV BLD AUTO: 10.1 FL (ref 8.9–12.9)
POTASSIUM SERPL-SCNC: 4.3 MMOL/L (ref 3.5–5.1)
Q-T INTERVAL, ECG07: 354 MS
QRS DURATION, ECG06: 88 MS
QTC CALCULATION (BEZET), ECG08: 492 MS
RBC # BLD AUTO: 4.1 M/UL (ref 3.8–5.2)
SARS-COV-2, COV2: NOT DETECTED
SERVICE CMNT-IMP: ABNORMAL
SERVICE CMNT-IMP: ABNORMAL
SERVICE CMNT-IMP: NORMAL
SERVICE CMNT-IMP: NORMAL
SODIUM SERPL-SCNC: 142 MMOL/L (ref 136–145)
SPECIMEN SOURCE, FCOV2M: NORMAL
TRIGL SERPL-MCNC: 61 MG/DL (ref ?–150)
TROPONIN I SERPL-MCNC: 11 NG/ML
VENTRICULAR RATE, ECG03: 116 BPM
VLDLC SERPL CALC-MCNC: 12.2 MG/DL
WBC # BLD AUTO: 9.6 K/UL (ref 3.6–11)

## 2020-07-02 PROCEDURE — 74011250636 HC RX REV CODE- 250/636: Performed by: INTERNAL MEDICINE

## 2020-07-02 PROCEDURE — 80048 BASIC METABOLIC PNL TOTAL CA: CPT

## 2020-07-02 PROCEDURE — 77030008543 HC TBNG MON PRSS MRTM -A: Performed by: INTERNAL MEDICINE

## 2020-07-02 PROCEDURE — C1874 STENT, COATED/COV W/DEL SYS: HCPCS | Performed by: INTERNAL MEDICINE

## 2020-07-02 PROCEDURE — 4A023N7 MEASUREMENT OF CARDIAC SAMPLING AND PRESSURE, LEFT HEART, PERCUTANEOUS APPROACH: ICD-10-PCS | Performed by: INTERNAL MEDICINE

## 2020-07-02 PROCEDURE — 85027 COMPLETE CBC AUTOMATED: CPT

## 2020-07-02 PROCEDURE — 77030015766: Performed by: INTERNAL MEDICINE

## 2020-07-02 PROCEDURE — C1725 CATH, TRANSLUMIN NON-LASER: HCPCS | Performed by: INTERNAL MEDICINE

## 2020-07-02 PROCEDURE — 74011250636 HC RX REV CODE- 250/636: Performed by: HOSPITALIST

## 2020-07-02 PROCEDURE — 77030028837 HC SYR ANGI PWR INJ COEU -A: Performed by: INTERNAL MEDICINE

## 2020-07-02 PROCEDURE — C1769 GUIDE WIRE: HCPCS | Performed by: INTERNAL MEDICINE

## 2020-07-02 PROCEDURE — 77030038269 HC DRN EXT URIN PURWCK BARD -A

## 2020-07-02 PROCEDURE — 77030019697 HC SYR ANGI INFL MRTM -B: Performed by: INTERNAL MEDICINE

## 2020-07-02 PROCEDURE — 77030019698 HC SYR ANGI MDLON MRTM -A: Performed by: INTERNAL MEDICINE

## 2020-07-02 PROCEDURE — 93458 L HRT ARTERY/VENTRICLE ANGIO: CPT | Performed by: INTERNAL MEDICINE

## 2020-07-02 PROCEDURE — 77030004549 HC CATH ANGI DX PRF MRTM -A: Performed by: INTERNAL MEDICINE

## 2020-07-02 PROCEDURE — 80061 LIPID PANEL: CPT

## 2020-07-02 PROCEDURE — 36415 COLL VENOUS BLD VENIPUNCTURE: CPT

## 2020-07-02 PROCEDURE — 74011636320 HC RX REV CODE- 636/320: Performed by: INTERNAL MEDICINE

## 2020-07-02 PROCEDURE — 65610000006 HC RM INTENSIVE CARE

## 2020-07-02 PROCEDURE — 84484 ASSAY OF TROPONIN QUANT: CPT

## 2020-07-02 PROCEDURE — 92928 PRQ TCAT PLMT NTRAC ST 1 LES: CPT | Performed by: INTERNAL MEDICINE

## 2020-07-02 PROCEDURE — B2111ZZ FLUOROSCOPY OF MULTIPLE CORONARY ARTERIES USING LOW OSMOLAR CONTRAST: ICD-10-PCS | Performed by: INTERNAL MEDICINE

## 2020-07-02 PROCEDURE — C1894 INTRO/SHEATH, NON-LASER: HCPCS | Performed by: INTERNAL MEDICINE

## 2020-07-02 PROCEDURE — 82962 GLUCOSE BLOOD TEST: CPT

## 2020-07-02 PROCEDURE — 77030012468 HC VLV BLEEDBK CNTRL ABBT -B: Performed by: INTERNAL MEDICINE

## 2020-07-02 PROCEDURE — 74011000258 HC RX REV CODE- 258: Performed by: HOSPITALIST

## 2020-07-02 PROCEDURE — 027136Z DILATION OF CORONARY ARTERY, TWO ARTERIES WITH THREE DRUG-ELUTING INTRALUMINAL DEVICES, PERCUTANEOUS APPROACH: ICD-10-PCS | Performed by: INTERNAL MEDICINE

## 2020-07-02 PROCEDURE — C1887 CATHETER, GUIDING: HCPCS | Performed by: INTERNAL MEDICINE

## 2020-07-02 PROCEDURE — 74011250636 HC RX REV CODE- 250/636: Performed by: NURSE PRACTITIONER

## 2020-07-02 PROCEDURE — 74011250637 HC RX REV CODE- 250/637: Performed by: INTERNAL MEDICINE

## 2020-07-02 PROCEDURE — 99152 MOD SED SAME PHYS/QHP 5/>YRS: CPT | Performed by: INTERNAL MEDICINE

## 2020-07-02 PROCEDURE — 77030019569 HC BND COMPR RAD TERU -B: Performed by: INTERNAL MEDICINE

## 2020-07-02 PROCEDURE — 74011000258 HC RX REV CODE- 258: Performed by: INTERNAL MEDICINE

## 2020-07-02 PROCEDURE — 99153 MOD SED SAME PHYS/QHP EA: CPT | Performed by: INTERNAL MEDICINE

## 2020-07-02 PROCEDURE — 77010033678 HC OXYGEN DAILY

## 2020-07-02 PROCEDURE — 71045 X-RAY EXAM CHEST 1 VIEW: CPT

## 2020-07-02 PROCEDURE — 74011250637 HC RX REV CODE- 250/637: Performed by: HOSPITALIST

## 2020-07-02 PROCEDURE — 74011000250 HC RX REV CODE- 250: Performed by: INTERNAL MEDICINE

## 2020-07-02 PROCEDURE — 77030010221 HC SPLNT WR POS TELE -B: Performed by: INTERNAL MEDICINE

## 2020-07-02 DEVICE — XIENCE SIERRA™ EVEROLIMUS ELUTING CORONARY STENT SYSTEM 3.00 MM X 18 MM / RAPID-EXCHANGE
Type: IMPLANTABLE DEVICE | Status: FUNCTIONAL
Brand: XIENCE SIERRA™

## 2020-07-02 DEVICE — XIENCE SIERRA™ EVEROLIMUS ELUTING CORONARY STENT SYSTEM 2.50 MM X 15 MM / RAPID-EXCHANGE
Type: IMPLANTABLE DEVICE | Status: FUNCTIONAL
Brand: XIENCE SIERRA™

## 2020-07-02 DEVICE — XIENCE SIERRA™ EVEROLIMUS ELUTING CORONARY STENT SYSTEM 2.25 MM X 15 MM / RAPID-EXCHANGE
Type: IMPLANTABLE DEVICE | Status: FUNCTIONAL
Brand: XIENCE SIERRA™

## 2020-07-02 RX ORDER — LISINOPRIL 5 MG/1
2.5 TABLET ORAL DAILY
Status: DISCONTINUED | OUTPATIENT
Start: 2020-07-03 | End: 2020-07-04 | Stop reason: HOSPADM

## 2020-07-02 RX ORDER — MIDAZOLAM HYDROCHLORIDE 1 MG/ML
INJECTION, SOLUTION INTRAMUSCULAR; INTRAVENOUS AS NEEDED
Status: DISCONTINUED | OUTPATIENT
Start: 2020-07-02 | End: 2020-07-02 | Stop reason: HOSPADM

## 2020-07-02 RX ORDER — HEPARIN SODIUM 200 [USP'U]/100ML
INJECTION, SOLUTION INTRAVENOUS
Status: COMPLETED | OUTPATIENT
Start: 2020-07-02 | End: 2020-07-02

## 2020-07-02 RX ORDER — FENTANYL CITRATE 50 UG/ML
INJECTION, SOLUTION INTRAMUSCULAR; INTRAVENOUS AS NEEDED
Status: DISCONTINUED | OUTPATIENT
Start: 2020-07-02 | End: 2020-07-02 | Stop reason: HOSPADM

## 2020-07-02 RX ORDER — FUROSEMIDE 40 MG/1
40 TABLET ORAL DAILY
Status: DISCONTINUED | OUTPATIENT
Start: 2020-07-03 | End: 2020-07-04 | Stop reason: HOSPADM

## 2020-07-02 RX ORDER — VERAPAMIL HYDROCHLORIDE 2.5 MG/ML
INJECTION, SOLUTION INTRAVENOUS AS NEEDED
Status: DISCONTINUED | OUTPATIENT
Start: 2020-07-02 | End: 2020-07-02 | Stop reason: HOSPADM

## 2020-07-02 RX ORDER — LIDOCAINE HYDROCHLORIDE 10 MG/ML
INJECTION, SOLUTION EPIDURAL; INFILTRATION; INTRACAUDAL; PERINEURAL AS NEEDED
Status: DISCONTINUED | OUTPATIENT
Start: 2020-07-02 | End: 2020-07-02 | Stop reason: HOSPADM

## 2020-07-02 RX ORDER — HEPARIN SODIUM 1000 [USP'U]/ML
INJECTION, SOLUTION INTRAVENOUS; SUBCUTANEOUS AS NEEDED
Status: DISCONTINUED | OUTPATIENT
Start: 2020-07-02 | End: 2020-07-02 | Stop reason: HOSPADM

## 2020-07-02 RX ADMIN — FUROSEMIDE 40 MG: 10 INJECTION, SOLUTION INTRAMUSCULAR; INTRAVENOUS at 18:50

## 2020-07-02 RX ADMIN — FUROSEMIDE 40 MG: 10 INJECTION, SOLUTION INTRAMUSCULAR; INTRAVENOUS at 08:38

## 2020-07-02 RX ADMIN — METOPROLOL TARTRATE 50 MG: 50 TABLET, FILM COATED ORAL at 18:50

## 2020-07-02 RX ADMIN — POTASSIUM CHLORIDE 20 MEQ: 750 TABLET, FILM COATED, EXTENDED RELEASE ORAL at 08:38

## 2020-07-02 RX ADMIN — Medication 10 ML: at 06:00

## 2020-07-02 RX ADMIN — ASPIRIN 81 MG: 81 TABLET, COATED ORAL at 08:38

## 2020-07-02 RX ADMIN — DOCUSATE SODIUM 100 MG: 100 CAPSULE, LIQUID FILLED ORAL at 08:38

## 2020-07-02 RX ADMIN — CEFTRIAXONE 1 G: 1 INJECTION, POWDER, FOR SOLUTION INTRAMUSCULAR; INTRAVENOUS at 17:23

## 2020-07-02 RX ADMIN — METOPROLOL TARTRATE 50 MG: 50 TABLET, FILM COATED ORAL at 08:38

## 2020-07-02 RX ADMIN — METHIMAZOLE 5 MG: 5 TABLET ORAL at 08:38

## 2020-07-02 RX ADMIN — Medication 10 ML: at 08:39

## 2020-07-02 RX ADMIN — DOCUSATE SODIUM 100 MG: 100 CAPSULE, LIQUID FILLED ORAL at 18:50

## 2020-07-02 NOTE — PROGRESS NOTES
[address] [place] [address]  [phone]      Cardiology Progress Note      7/2/2020 10 AM    Admit Date: 7/1/2020    Admit Diagnosis:   Acute respiratory failure with hypoxia (HCC) [J96.01]  Pulmonary edema [J81.1]  NSTEMI (non-ST elevated myocardial infarction) ([facility] [I21.4]    Interval History/Subjective:     Anitha Mcintosh is a 80 y.o. female with PMH PVD, MI, HTN, CAD, HLD, angina who was admitted for Acute respiratory failure with hypoxia (HCC) [J96.01]  Pulmonary edema [J81.1]  NSTEMI (non-ST elevated myocardial infarction) ([facility] [I21.4]. -VSS  -trop peak 12.4, down to 11.00;  CXR PNA? -no weight; I/O incomplete  -Ms. Walker is feeling well.   She denies any further chest pain and she feels like her breathing is back to normal     Visit Vitals  /70 (BP 1 Location: Right arm, BP Patient Position: At rest)   Pulse 77   Temp 98.3 °F (36.8 °C)   Resp 18   Wt 54 kg (119 lb 0.8 oz)   SpO2 95%   BMI 20.43 kg/m²       Current Facility-Administered Medications   Medication Dose Route Frequency    aspirin delayed-release tablet 81 mg  81 mg Oral DAILY    furosemide (LASIX) injection 40 mg  40 mg IntraVENous BID    sodium chloride (NS) flush 5-40 mL  5-40 mL IntraVENous Q8H    sodium chloride (NS) flush 5-40 mL  5-40 mL IntraVENous PRN    acetaminophen (TYLENOL) tablet 650 mg  650 mg Oral Q6H PRN    ondansetron (ZOFRAN) injection 4 mg  4 mg IntraVENous Q6H PRN    docusate sodium (COLACE) capsule 100 mg  100 mg Oral BID    glucose chewable tablet 16 g  4 Tab Oral PRN    dextrose (D50W) injection syrg 12.5-25 g  12.5-25 g IntraVENous PRN    glucagon (GLUCAGEN) injection 1 mg  1 mg IntraMUSCular PRN    insulin lispro (HUMALOG) injection   SubCUTAneous AC&HS    methIMAzole (TAPAZOLE) tablet 5 mg  5 mg Oral EVERY OTHER DAY    metoprolol tartrate (LOPRESSOR) tablet 50 mg  50 mg Oral BID    atorvastatin (LIPITOR) tablet 20 mg  20 mg Oral QHS    potassium chloride SR (KLOR-CON 10) tablet 20 mEq  20 mEq Oral DAILY    cefTRIAXone (ROCEPHIN) 1 g in 0.9% sodium chloride (MBP/ADV) 50 mL  1 g IntraVENous Q24H       Objective:      Physical Exam:  General: pleasant, elderly AAF resting in bed in NAD  Heart:   Lungs: speaking in full sentences. Unlabored. Abdomen:   Extremities:   Neurologic: Grossly normal     **bedside exam deferred due to covid rule out isolation. Patient viewed through ED glass and door and assessed over the phone**    Data Review:   Recent Labs     07/02/20 0337 07/01/20 0918   WBC 9.6 8.7   HGB 12.6 13.1   HCT 40.5 42.5    219     Recent Labs     07/02/20 0337 07/01/20 2215 07/01/20  0918     --  143   K 4.3  --  3.3*   *  --  110*   CO2 26  --  23   GLU 90  --  367*   BUN 24*  --  20   CREA 0.82  --  1.09*   CA 9.1  --  8.6   MG  --  1.9  --    ALB  --   --  3.2*   TBILI  --   --  0.7   ALT  --   --  38       Recent Labs     07/02/20 0337 07/01/20 2215 07/01/20  1422 07/01/20  0918   TROIQ 11.00* 12.40* 11.50* 0.21*   CPK  --   --   --  109   CKMB  --   --   --  2.6         Intake/Output Summary (Last 24 hours) at 7/2/2020 1127  Last data filed at 7/2/2020 0900  Gross per 24 hour   Intake 240 ml   Output 2050 ml   Net -1810 ml        Telemetry: SR  ECG: ST;  Flattened t waves lateral leads  Echocardiogram: ordered   CXRAY: \"Findings suggestive of congestive change. Developing infiltration cannot be excluded. \"  CTA chest: \"1. Negative for pulmonary embolus. 2. Mild bilateral pleural effusions, greater on the right. 3. Extensive bilateral airspace disease. While significant edema and atelectasis  could have this appearance, findings are worrisome for infection. A viral  infection could have this appearance. \"    Assessment:     Active Problems:    Pulmonary edema (7/1/2020)      NSTEMI (non-ST elevated myocardial infarction) ([facility] (7/1/2020)      Acute respiratory failure with hypoxia ([facility] (7/1/2020)        Plan:     Acute respiratory failure/pulmonary edema/suspected HF/possible PNA:  Improved. Patient no longer SOB. CXR today states no change and appears as PNA, however. · Covid swab came back negative after patient seen through glass/telephone today  · Possible PNA per hospitalist   · Continue diuresis - would switch to lower daily PO dose tomorrow  · ECHO pending  · Continue BB and ACEi  · Possible cardiac cath later today      NSTEMI:  Troponin peaked at 12.4. No further chest pain. Has CAD history  · ASA, statin, BB  · Had lovenox yesterday evening after trop started increasing  · ECHO pending  · Now that covid swab negative, plan for cardiac cath later today.         HTN: stable  · Continue BB, ACEi  · Continue to hold home amlodipine and HCTZ for now        Frederick Kay, NP  DNP, RN, AGACNP-BC

## 2020-07-02 NOTE — ACP (ADVANCE CARE PLANNING)
Responded to request by In Basket to assist with Advance Medical Directive (AMD) in Progressive Care unit. Patient has isolation precautions in place. Consulted with patients nurse. Nurse indicated that patient was not feeling well at this time. Explained AMD document process to nurse.  will re-visit, to assisted patient in completing the document when nurse pages and states that the patient is ready. Left paperwork (AMD document) with nurse to give to patient to review. Rev.  Mariama Walters EdD MDiv     For  Assistance Page [phone])

## 2020-07-02 NOTE — PROGRESS NOTES
Hospitalist Progress Note    NAME: Niya Townsend   :  1932   MRN:  [id number]       Assessment / Plan:  NSTEMI  Acute on chronic CHF w pulm edema  -elevated trop 0.21 and chest pain earlier, POA. Repeat troponin 12  -Hx CAD with prior MI in  and stents per patient.    -Presented with exertional shortness of breath and hypoxia, new onset  -per notes CP resolved in ED  -Cardiology consulted and patient is planned for cath later this morning  --rapid SARS-COV-2 test negative  --continue O2 and wean down. Bipap prn if develop respiratory distress  --cont diuresis w IV lasix   --continue nitropaste  --took aspirin 81mg at home already. Give 162mg now and then resume 81mg daily  --resume metoprol, zocor. Hold amlodipine and lisinopril to allow BP room for diuresis  --strict I/O, daily weight  --check fasting lipid panel    Acute hypoxic respiratory failure, present on admission due to  Acute pulmonary edema with diffuse bilateral opacities. -improving w diuresis  -does not wear O2 at home  -CTA negative for PE  -procal < 0.05 low suspicion of  bacterial pneumonia     Acute hyperglycemia  concerning for DM vs. Stress reaction  --put on diabetic cardiac diet  --check A1c  --add low dose SSI  --check UA     Hyperthyroidism  --continue methimazole. Check TSH     Hx frequent fall  --fall precaution     Body mass index is 21.46 kg/m².     Code: discussed, wants full code  DVT prophylaxis: lovenox  Surrogate decision maker:  Pilo Huertas     Addendum:  UA c/w uti, POA. Continue rocephin. Add on urine culture. Repeat trop 11 c/w NSTEMI, POA. Give lovenox 1mg/kg x 1. Notified cardiology, spoke to Hammad Medina NP. Subjective:     Chief Complaint / Reason for Physician Visit  Presented with chest pain, hypoxia, exertional shortness of breath. All symptoms improved after initiation of diuresis. Patient plan for cath later this a.m. Discussed with RN events overnight.      Review of Systems:  Symptom Y/N Comments  Symptom Y/N Comments   Fever/Chills n   Chest Pain n resolved   Poor Appetite n   Edema y    Cough n   Abdominal Pain     Sputum n   Joint Pain     SOB/CORTES y   Pruritis/Rash     Nausea/vomit n   Tolerating PT/OT     Diarrhea n   Tolerating Diet     Constipation n   Other       Could NOT obtain due to:      Objective:     VITALS:   Last 24hrs VS reviewed since prior progress note. Most recent are:  Patient Vitals for the past 24 hrs:   Temp Pulse Resp BP SpO2   07/02/20 0747 98 °F (36.7 °C) 87 24 118/56 95 %   07/02/20 0327 98.4 °F (36.9 °C) 82 21 111/62 99 %   07/01/20 2252 98.7 °F (37.1 °C) 83 27 110/57 98 %   07/01/20 1950 99 °F (37.2 °C) 84 29 98/64 97 %   07/01/20 1650     97 %   07/01/20 1648 99.1 °F (37.3 °C) 86 24 128/83 97 %   07/01/20 1445  88 29 118/74 96 %   07/01/20 1415  88 (!) 33 119/69 96 %   07/01/20 1100     95 %   07/01/20 1051  92 23  92 %   07/01/20 1000  91 26 140/78 96 %   07/01/20 0926  92  138/76    07/01/20 0922     96 %   07/01/20 0912     96 %   07/01/20 0853  (!) 113 (!) 33 146/79 (!) 53 %       Intake/Output Summary (Last 24 hours) at 7/2/2020 0842  Last data filed at 7/2/2020 0610  Gross per 24 hour   Intake    Output 1750 ml   Net -1750 ml        PHYSICAL EXAM:  General: WD, WN. Alert, cooperative, no acute distress    EENT:  EOMI. Anicteric sclerae. MMM  Resp:  Bibasilar rales, no wheeze, upper fields clear. No accessory muscle use  CV:  Regular  rhythm,  mild b/l LE edema  GI:  Soft, Non distended, Non tender.  +Bowel sounds  Neurologic:  Alert and oriented X 3, normal speech,   Psych:   Good insight. Not anxious nor agitated  Skin:  No rashes.   No jaundice    Reviewed most current lab test results and cultures  YES  Reviewed most current radiology test results   YES  Review and summation of old records today    NO  Reviewed patient's current orders and MAR    YES  PMH/SH reviewed - no change compared to H&P  ________________________________________________________________________  Care Plan discussed with:    Comments   Patient x    Family      RN x    Care Manager     Consultant                       x Multidiciplinary team rounds were held today with , nursing, pharmacist and clinical coordinator. Patient's plan of care was discussed; medications were reviewed and discharge planning was addressed. ________________________________________________________________________  Total NON critical care TIME: 35   Minutes    Total CRITICAL CARE TIME Spent:   Minutes non procedure based      Comments   >50% of visit spent in counseling and coordination of care x    ________________________________________________________________________  Fran Andrade DO     Procedures: see electronic medical records for all procedures/Xrays and details which were not copied into this note but were reviewed prior to creation of Plan. LABS:  I reviewed today's most current labs and imaging studies.   Pertinent labs include:  Recent Labs     07/02/20 0337 07/01/20 0918   WBC 9.6 8.7   HGB 12.6 13.1   HCT 40.5 42.5    219     Recent Labs     07/02/20 0337 07/01/20  2215 07/01/20 0918     --  143   K 4.3  --  3.3*   *  --  110*   CO2 26  --  23   GLU 90  --  367*   BUN 24*  --  20   CREA 0.82  --  1.09*   CA 9.1  --  8.6   MG  --  1.9  --    ALB  --   --  3.2*   TBILI  --   --  0.7   ALT  --   --  38       Signed: Kathy Salazar, DO

## 2020-07-02 NOTE — PROGRESS NOTES
Problem: Falls - Risk of  Goal: *Absence of Falls  Description: Document Rae [place] Fall Risk and appropriate interventions in the flowsheet.   Outcome: Progressing Towards Goal  Note: Fall Risk Interventions:            Medication Interventions: Patient to call before getting OOB                   Problem: Patient Education: Go to Patient Education Activity  Goal: Patient/Family Education  Outcome: Progressing Towards Goal

## 2020-07-02 NOTE — PROGRESS NOTES
Attempted to get patient for echo and was told by the nurse that patient would be going to the cath lab and could not get echo done at this time.

## 2020-07-02 NOTE — PROGRESS NOTES
Problem: Falls - Risk of  Goal: *Absence of Falls  Description: Document Polo Dawkins Fall Risk and appropriate interventions in the flowsheet. Outcome: Progressing Towards Goal  Note: Fall Risk Interventions:            Medication Interventions: Patient to call before getting OOB                   Problem: Breathing Pattern - Ineffective  Goal: *Absence of hypoxia  Outcome: Progressing Towards Goal     Problem: Risk for Spread of Infection  Goal: Prevent transmission of infectious organism to others  Description: Prevent the transmission of infectious organisms to other patients, staff members, and visitors.   Outcome: Progressing Towards Goal

## 2020-07-02 NOTE — DISCHARGE INSTRUCTIONS
Radial Cardiac Catheterization/Angiography Discharge Instructions    It is normal to feel tired the first couple days. Take it easy and follow the physicians instructions. CHECK THE CATHETER INSERTION SITE DAILY:    Remove the wrist dressing 24 hours after the procedure. You may shower 24 hours after the procedure. Wash with soap and water and pat dry. Gentle cleaning of the site with soap and water is sufficient, cover with a dry clean dressing or bandage. Do not apply creams or powders to the area. No soaking the wrist for 3 days. Leave the puncture site open to air after 24 hours post-procedure. CALL THE PHYSICIANS:     If the site becomes red, swollen or feels warm to the touch  If there is bleeding or drainage or if there is unusual pain at the radial site. If there is any minor oozing, you may apply a band-aid and remove after 12 hours. If the bleeding continues, hold pressure with the middle finger against the puncture site and the thumb against the back of the wrist,call 911 to be transported to the hospital.  DO NOT DRIVE YOURSELF, [facility]. ACTIVITY:   For the first 24 hours do not manipulate the wrist.  No lifting, pushing or pulling over 3-5 pounds with the affected wrist for 7 daysand no straining the insertion site. Do not life grocery bags or the garbage can, do not run the vacuum  or  for 7 days. Start with short walks as in the hospital and gradually increase as tolerated each day. It is recommended to walk 30 minutes 5-7 days per week. Follow your physicians instructions on activity. Avoid walking outside in extremes of heat or cold. Walk inside when it is cold and windy or hot and humid. Things to keep in mind:  No driving for at least 24 hours, or as designated by your physician. Limit the number of times you go up and down the stairs  Take rests and pace yourself with activity.   Be careful and do not strain with bowel movements. MEDICATIONS:    Take all medications as prescribed  Call your physician if you have any questions  Keep an updated list of your medications with you at all times and give a list to your physician and pharmacist    SIGNS AND SYMPTOMS:   Be cautious of symptoms of angina or recurrent symptoms such as chest discomfort, unusual shortness of breath or fatigue. These could be symptoms of restenosis, a new blockage or a heart attack. If your symptoms are relieved with rest it is still recommended that you notify your physician of recurrent chest pain or discomfort. For CHEST PAIN or symptoms of angina not relieved with rest:  If the discomfort is not relieved with rest, and you have been prescribed Nitroglycerin, take as directed (taken under the tongue, one at a time 5 minutes apart for a total of 3 doses). If the discomfort is not relieved after the 3rd nitroglycerin, call 911. If you have not been prescribed Nitroglycerin  and your chest discomfort is not relieved with rest, call 911. AFTER CARE:   Follow up with your physician as instructed. Follow a heart healthy diet with proper portion control, daily stress management, daily exercise, blood pressure and cholesterol control , and smoking cessation.

## 2020-07-02 NOTE — PROGRESS NOTES
Spiritual Care Assessment/Progress Note  [facility]      NAME: Nia Hoffmann      MRN: [id number]  AGE: 80 y.o. SEX: female  Gnosticism Affiliation: Buddhism   Language: English     7/2/2020     Total Time (in minutes): 62     Spiritual Assessment begun in [facility] PROGRESSIVE CARE through conversation with:         []Patient        [] Family    [] Friend(s)        Reason for Consult: Advance medical directive consult     Spiritual beliefs: (Please include comment if needed)     [] Identifies with a samantha tradition:         [] Supported by a samantha community:            [] Claims no spiritual orientation:           [] Seeking spiritual identity:                [] Adheres to an individual form of spirituality:           [x] Not able to assess:                           Identified resources for coping:      [] Prayer                               [] Music                  [] Guided Imagery     [] Family/friends                 [] Pet visits     [] Devotional reading                         [x] Unknown     [] Other:                                           Interventions offered during this visit: (See comments for more details)                Plan of Care:     [] Support spiritual and/or cultural needs    [] Support AMD and/or advance care planning process      [] Support grieving process   [] Coordinate Rites and/or Rituals    [] Coordination with community clergy   [x] No spiritual needs identified at this time   [] Detailed Plan of Care below (See Comments)  [] Make referral to Music Therapy  [] Make referral to Pet Therapy     [] Make referral to Addiction services  [] Make referral to [facility]  [] Make referral to Spiritual Care Partner  [] No future visits requested        [] Follow up visits as needed     Comments:  Responded to request by In Basket to assist with Advance Medical Directive (AMD) in Progressive Care unit. Patient has isolation precautions in place.  Consulted with patients nurse. Nurse indicated that patient was not feeling well at this time. Explained AMD document process to nurse.  will re-visit, to assisted patient in completing the document when nurse pages and states that the patient is ready. Left paperwork (AMD document) with nurse to give to patient to review. Patient was unavailable to communicate at the present time. Rev.  Matilde Jiménez EdD MDiv     For  Assistance Page [phone])

## 2020-07-02 NOTE — PROGRESS NOTES
Bedside shift change report given to Xiomara Manrique (oncoming nurse) by Juan Diego Couch (offgoing nurse). Report included the following information SBAR, Kardex, Intake/Output, MAR, Recent Results and Cardiac Rhythm ST.    2315  Troponin result 12.40. Paged hospitalist, no new orders. 0505  Troponin result 11.00  Paged hospitalist, no new orders. Bedside shift change report given to Parth Lyons (oncoming nurse) by Xiomara Manrique (offgoing nurse).  Report included the following information SBAR, Kardex, Intake/Output, MAR, Recent Results and Cardiac Rhythm ST.

## 2020-07-03 ENCOUNTER — APPOINTMENT (OUTPATIENT)
Dept: NON INVASIVE DIAGNOSTICS | Age: 85
DRG: 246 | End: 2020-07-03
Attending: HOSPITALIST
Payer: MEDICARE

## 2020-07-03 PROBLEM — I42.9 CARDIOMYOPATHY (HCC): Status: ACTIVE | Noted: 2020-07-03

## 2020-07-03 LAB
ANION GAP SERPL CALC-SCNC: 5 MMOL/L (ref 5–15)
BACTERIA SPEC CULT: NORMAL
BASOPHILS # BLD: 0.1 K/UL (ref 0–0.1)
BASOPHILS NFR BLD: 1 % (ref 0–1)
BUN SERPL-MCNC: 24 MG/DL (ref 6–20)
BUN/CREAT SERPL: 32 (ref 12–20)
CALCIUM SERPL-MCNC: 9 MG/DL (ref 8.5–10.1)
CC UR VC: NORMAL
CHLORIDE SERPL-SCNC: 105 MMOL/L (ref 97–108)
CO2 SERPL-SCNC: 30 MMOL/L (ref 21–32)
CREAT SERPL-MCNC: 0.76 MG/DL (ref 0.55–1.02)
DIFFERENTIAL METHOD BLD: NORMAL
ECHO AO ROOT DIAM: 2.53 CM
ECHO AR MAX VEL PISA: 117.78 CM/S
ECHO AR MAX VEL PISA: 136.26 CM/S
ECHO AR MAX VEL PISA: 147.84 CM/S
ECHO AR MAX VEL PISA: 84.08 CM/S
ECHO AV AREA PEAK VELOCITY: 1.84 CM2
ECHO AV AREA PEAK VELOCITY: 1.84 CM2
ECHO AV AREA PEAK VELOCITY: 1.9 CM2
ECHO AV AREA PEAK VELOCITY: 1.9 CM2
ECHO AV AREA VTI: 1.83 CM2
ECHO AV AREA/BSA VTI: 1.2 CM2/M2
ECHO AV MEAN GRADIENT: 4.37 MMHG
ECHO AV PEAK GRADIENT: 10.35 MMHG
ECHO AV PEAK GRADIENT: 11.08 MMHG
ECHO AV PEAK VELOCITY: 160.86 CM/S
ECHO AV PEAK VELOCITY: 166.42 CM/S
ECHO AV VTI: 25.81 CM
ECHO EST RA PRESSURE: 10 MMHG
ECHO LA AREA 4C: 26.6 CM2
ECHO LA MAJOR AXIS: 3.32 CM
ECHO LA MINOR AXIS: 2.12 CM
ECHO LA VOL 4C: 93.51 ML (ref 22–52)
ECHO LA VOLUME INDEX A4C: 59.61 ML/M2 (ref 16–28)
ECHO LV E' LATERAL VELOCITY: 5.18 CM/S
ECHO LV E' SEPTAL VELOCITY: 2.82 CM/S
ECHO LV EDV A4C: 111.68 ML
ECHO LV EDV INDEX A4C: 71.2 ML/M2
ECHO LV EJECTION FRACTION A4C: 18 PERCENT
ECHO LV ESV A4C: 92.05 ML
ECHO LV ESV INDEX A4C: 58.7 ML/M2
ECHO LV INTERNAL DIMENSION DIASTOLIC: 4.26 CM (ref 3.9–5.3)
ECHO LV INTERNAL DIMENSION SYSTOLIC: 3.66 CM
ECHO LV IVSD: 1.35 CM (ref 0.6–0.9)
ECHO LV MASS 2D: 193.3 G (ref 67–162)
ECHO LV MASS INDEX 2D: 123.2 G/M2 (ref 43–95)
ECHO LV POSTERIOR WALL DIASTOLIC: 1.15 CM (ref 0.6–0.9)
ECHO LVOT CARDIAC OUTPUT: 3.8 LITER/MINUTE
ECHO LVOT DIAM: 1.78 CM
ECHO LVOT PEAK GRADIENT: 6 MMHG
ECHO LVOT PEAK GRADIENT: 6 MMHG
ECHO LVOT PEAK VELOCITY: 122.49 CM/S
ECHO LVOT PEAK VELOCITY: 122.49 CM/S
ECHO LVOT SV: 47.2 ML
ECHO LVOT VTI: 18.89 CM
ECHO MV A VELOCITY: 127.31 CM/S
ECHO MV AREA PHT: 8.57 CM2
ECHO MV AREA VTI: 1.73 CM2
ECHO MV E DECELERATION TIME (DT): 0.08 S
ECHO MV E VELOCITY: 72.2 CM/S
ECHO MV E/A RATIO: 0.57
ECHO MV E/E' LATERAL: 13.94
ECHO MV E/E' RATIO (AVERAGED): 19.77
ECHO MV E/E' SEPTAL: 25.6
ECHO MV MEAN GRADIENT: 2.28 MMHG
ECHO MV MEAN GRADIENT: 3.11 MMHG
ECHO MV PEAK GRADIENT: 8.74 MMHG
ECHO MV PRESSURE HALF TIME (PHT): 0.03 S
ECHO MV VTI: 27.25 CM
ECHO PV MEAN GRADIENT: 3.5 MMHG
ECHO PV PEAK INSTANTANEOUS GRADIENT SYSTOLIC: 5.55 MMHG
ECHO PV PEAK INSTANTANEOUS GRADIENT SYSTOLIC: 7.43 MMHG
ECHO PV VTI: 27.52 CM
ECHO RA AREA 4C: 11.87 CM2
ECHO RIGHT VENTRICULAR SYSTOLIC PRESSURE (RVSP): 42.86 MMHG
ECHO RV TAPSE: 2.14 CM (ref 1.5–2)
ECHO TV REGURGITANT MAX VELOCITY: 285.51 CM/S
ECHO TV REGURGITANT PEAK GRADIENT: 32.86 MMHG
EOSINOPHIL # BLD: 0.2 K/UL (ref 0–0.4)
EOSINOPHIL NFR BLD: 3 % (ref 0–7)
ERYTHROCYTE [DISTWIDTH] IN BLOOD BY AUTOMATED COUNT: 13.5 % (ref 11.5–14.5)
GLUCOSE BLD STRIP.AUTO-MCNC: 105 MG/DL (ref 65–100)
GLUCOSE BLD STRIP.AUTO-MCNC: 122 MG/DL (ref 65–100)
GLUCOSE BLD STRIP.AUTO-MCNC: 123 MG/DL (ref 65–100)
GLUCOSE BLD STRIP.AUTO-MCNC: 126 MG/DL (ref 65–100)
GLUCOSE BLD STRIP.AUTO-MCNC: 143 MG/DL (ref 65–100)
GLUCOSE SERPL-MCNC: 104 MG/DL (ref 65–100)
HCT VFR BLD AUTO: 41 % (ref 35–47)
HGB BLD-MCNC: 13.5 G/DL (ref 11.5–16)
IMM GRANULOCYTES # BLD AUTO: 0 K/UL (ref 0–0.04)
IMM GRANULOCYTES NFR BLD AUTO: 0 % (ref 0–0.5)
LYMPHOCYTES # BLD: 1.3 K/UL (ref 0.8–3.5)
LYMPHOCYTES NFR BLD: 18 % (ref 12–49)
MCH RBC QN AUTO: 31.3 PG (ref 26–34)
MCHC RBC AUTO-ENTMCNC: 32.9 G/DL (ref 30–36.5)
MCV RBC AUTO: 95.1 FL (ref 80–99)
MONOCYTES # BLD: 0.9 K/UL (ref 0–1)
MONOCYTES NFR BLD: 12 % (ref 5–13)
NEUTS SEG # BLD: 4.6 K/UL (ref 1.8–8)
NEUTS SEG NFR BLD: 66 % (ref 32–75)
NRBC # BLD: 0 K/UL (ref 0–0.01)
NRBC BLD-RTO: 0 PER 100 WBC
PLATELET # BLD AUTO: 203 K/UL (ref 150–400)
PMV BLD AUTO: 10.3 FL (ref 8.9–12.9)
POTASSIUM SERPL-SCNC: 3.7 MMOL/L (ref 3.5–5.1)
RBC # BLD AUTO: 4.31 M/UL (ref 3.8–5.2)
SERVICE CMNT-IMP: ABNORMAL
SERVICE CMNT-IMP: NORMAL
SODIUM SERPL-SCNC: 140 MMOL/L (ref 136–145)
WBC # BLD AUTO: 6.9 K/UL (ref 3.6–11)

## 2020-07-03 PROCEDURE — 74011250637 HC RX REV CODE- 250/637: Performed by: HOSPITALIST

## 2020-07-03 PROCEDURE — 74011250637 HC RX REV CODE- 250/637: Performed by: NURSE PRACTITIONER

## 2020-07-03 PROCEDURE — 74011250637 HC RX REV CODE- 250/637: Performed by: INTERNAL MEDICINE

## 2020-07-03 PROCEDURE — 77030038269 HC DRN EXT URIN PURWCK BARD -A

## 2020-07-03 PROCEDURE — 93306 TTE W/DOPPLER COMPLETE: CPT

## 2020-07-03 PROCEDURE — 74011636637 HC RX REV CODE- 636/637: Performed by: HOSPITALIST

## 2020-07-03 PROCEDURE — 80048 BASIC METABOLIC PNL TOTAL CA: CPT

## 2020-07-03 PROCEDURE — 65610000006 HC RM INTENSIVE CARE

## 2020-07-03 PROCEDURE — 85025 COMPLETE CBC W/AUTO DIFF WBC: CPT

## 2020-07-03 PROCEDURE — 36415 COLL VENOUS BLD VENIPUNCTURE: CPT

## 2020-07-03 PROCEDURE — 94760 N-INVAS EAR/PLS OXIMETRY 1: CPT

## 2020-07-03 PROCEDURE — 82962 GLUCOSE BLOOD TEST: CPT

## 2020-07-03 RX ORDER — FUROSEMIDE 40 MG/1
TABLET ORAL
Qty: 30 TAB | Refills: 11 | Status: SHIPPED | OUTPATIENT
Start: 2020-07-04 | End: 2020-08-11

## 2020-07-03 RX ORDER — CARVEDILOL 6.25 MG/1
6.25 TABLET ORAL 2 TIMES DAILY WITH MEALS
Qty: 60 TAB | Refills: 11 | Status: SHIPPED | OUTPATIENT
Start: 2020-07-03 | End: 2020-08-11

## 2020-07-03 RX ORDER — POTASSIUM CHLORIDE 1500 MG/1
20 TABLET, FILM COATED, EXTENDED RELEASE ORAL DAILY
Qty: 30 TAB | Refills: 11 | Status: SHIPPED | OUTPATIENT
Start: 2020-07-04 | End: 2020-07-03

## 2020-07-03 RX ORDER — SIMVASTATIN 40 MG/1
20 TABLET, FILM COATED ORAL
Qty: 30 TAB | Refills: 11 | Status: SHIPPED | OUTPATIENT
Start: 2020-07-03

## 2020-07-03 RX ORDER — CARVEDILOL 6.25 MG/1
6.25 TABLET ORAL 2 TIMES DAILY
Qty: 60 TAB | Refills: 11 | Status: SHIPPED | OUTPATIENT
Start: 2020-07-03 | End: 2020-07-03

## 2020-07-03 RX ORDER — CARVEDILOL 6.25 MG/1
6.25 TABLET ORAL 2 TIMES DAILY
Status: DISCONTINUED | OUTPATIENT
Start: 2020-07-03 | End: 2020-07-04 | Stop reason: HOSPADM

## 2020-07-03 RX ORDER — FUROSEMIDE 40 MG/1
TABLET ORAL
Qty: 30 TAB | Refills: 11 | Status: SHIPPED | OUTPATIENT
Start: 2020-07-04 | End: 2020-07-03

## 2020-07-03 RX ORDER — AMOXICILLIN AND CLAVULANATE POTASSIUM 500; 125 MG/1; MG/1
1 TABLET, FILM COATED ORAL EVERY 12 HOURS
Status: DISCONTINUED | OUTPATIENT
Start: 2020-07-03 | End: 2020-07-03

## 2020-07-03 RX ORDER — POTASSIUM CHLORIDE 1500 MG/1
20 TABLET, FILM COATED, EXTENDED RELEASE ORAL DAILY
Qty: 30 TAB | Refills: 11 | Status: SHIPPED | OUTPATIENT
Start: 2020-07-04 | End: 2021-08-06 | Stop reason: SDUPTHER

## 2020-07-03 RX ADMIN — Medication 10 ML: at 00:11

## 2020-07-03 RX ADMIN — FUROSEMIDE 40 MG: 40 TABLET ORAL at 09:47

## 2020-07-03 RX ADMIN — CARVEDILOL 6.25 MG: 6.25 TABLET, FILM COATED ORAL at 17:15

## 2020-07-03 RX ADMIN — AMOXICILLIN AND CLAVULANATE POTASSIUM 1 TABLET: 500; 125 TABLET, FILM COATED ORAL at 15:48

## 2020-07-03 RX ADMIN — ATORVASTATIN CALCIUM 20 MG: 20 TABLET, FILM COATED ORAL at 20:49

## 2020-07-03 RX ADMIN — LISINOPRIL 2.5 MG: 5 TABLET ORAL at 09:47

## 2020-07-03 RX ADMIN — INSULIN LISPRO 2 UNITS: 100 INJECTION, SOLUTION INTRAVENOUS; SUBCUTANEOUS at 12:54

## 2020-07-03 RX ADMIN — Medication 10 ML: at 14:00

## 2020-07-03 RX ADMIN — POTASSIUM CHLORIDE 20 MEQ: 750 TABLET, FILM COATED, EXTENDED RELEASE ORAL at 09:46

## 2020-07-03 RX ADMIN — Medication 10 ML: at 05:19

## 2020-07-03 RX ADMIN — METOPROLOL TARTRATE 50 MG: 50 TABLET, FILM COATED ORAL at 09:47

## 2020-07-03 RX ADMIN — TICAGRELOR 90 MG: 90 TABLET ORAL at 17:15

## 2020-07-03 RX ADMIN — ATORVASTATIN CALCIUM 20 MG: 20 TABLET, FILM COATED ORAL at 00:11

## 2020-07-03 RX ADMIN — ASPIRIN 81 MG: 81 TABLET, COATED ORAL at 09:46

## 2020-07-03 RX ADMIN — DOCUSATE SODIUM 100 MG: 100 CAPSULE, LIQUID FILLED ORAL at 09:47

## 2020-07-03 RX ADMIN — Medication 10 ML: at 20:49

## 2020-07-03 RX ADMIN — AMOXICILLIN AND CLAVULANATE POTASSIUM 1 TABLET: 500; 125 TABLET, FILM COATED ORAL at 20:48

## 2020-07-03 RX ADMIN — TICAGRELOR 90 MG: 90 TABLET ORAL at 05:19

## 2020-07-03 RX ADMIN — DOCUSATE SODIUM 100 MG: 100 CAPSULE, LIQUID FILLED ORAL at 17:15

## 2020-07-03 NOTE — ROUTINE PROCESS
Bedside and Verbal shift change report given to [address] Jj (oncoming nurse) by Son Chowdhury (offgoing nurse). Report included the following information SBAR, Kardex, MAR and Recent Results.

## 2020-07-03 NOTE — PROGRESS NOTES
Problem: Falls - Risk of  Goal: *Absence of Falls  Description: Document Lee Ann Stallings Fall Risk and appropriate interventions in the flowsheet. Outcome: Progressing Towards Goal  Note: Fall Risk Interventions:            Medication Interventions: Patient to call before getting OOB    Elimination Interventions: Call light in reach, Patient to call for help with toileting needs, Stay With Me (per policy), Toilet paper/wipes in reach, Toileting schedule/hourly rounds              Problem: Breathing Pattern - Ineffective  Goal: *Absence of hypoxia  Outcome: Progressing Towards Goal  Goal: *Use of effective breathing techniques  Outcome: Progressing Towards Goal     Problem: Patient Education: Go to Patient Education Activity  Goal: Patient/Family Education  Outcome: Resolved/Met     Problem: Patient Education: Go to Patient Education Activity  Goal: Patient/Family Education  Outcome: Resolved/Met     Problem: Risk for Spread of Infection  Goal: Prevent transmission of infectious organism to others  Description: Prevent the transmission of infectious organisms to other patients, staff members, and visitors.   Outcome: Resolved/Met     Problem: Patient Education:  Go to Education Activity  Goal: Patient/Family Education  Outcome: Resolved/Met     Problem: Cath Lab Procedures: Pre-Procedure  Goal: Off Pathway (Use only if patient is Off Pathway)  Outcome: Resolved/Met  Goal: Activity/Safety  Outcome: Resolved/Met  Goal: Consults, if ordered  Outcome: Resolved/Met  Goal: Diagnostic Test/Procedures  Outcome: Resolved/Met  Goal: Nutrition/Diet  Outcome: Resolved/Met  Goal: Discharge Planning  Outcome: Resolved/Met  Goal: Medications  Outcome: Resolved/Met  Goal: Respiratory  Outcome: Resolved/Met  Goal: Treatments/Interventions/Procedures  Outcome: Resolved/Met  Goal: Psychosocial  Outcome: Resolved/Met  Goal: *Verbalize description of procedure  Outcome: Resolved/Met  Goal: *Consent signed  Outcome: Resolved/Met     Problem: Cath Lab Procedures: Post-Cath Day of Procedure (Initiate SCIP Measures for Post-Op Care)  Goal: Off Pathway (Use only if patient is Off Pathway)  Outcome: Resolved/Met  Goal: Activity/Safety  Outcome: Resolved/Met  Goal: Consults, if ordered  Outcome: Resolved/Met  Goal: Diagnostic Test/Procedures  Outcome: Resolved/Met  Goal: Nutrition/Diet  Outcome: Resolved/Met  Goal: Discharge Planning  Outcome: Resolved/Met  Goal: Medications  Outcome: Resolved/Met  Goal: Respiratory  Outcome: Resolved/Met  Goal: Treatments/Interventions/Procedures  Outcome: Resolved/Met  Goal: Psychosocial  Outcome: Resolved/Met  Goal: *Procedure site is without bleeding and signs of infection six hours post sheath removal  Outcome: Resolved/Met  Goal: *Hemodynamically stable  Outcome: Resolved/Met  Goal: *Optimal pain control at patient's stated goal  Outcome: Resolved/Met

## 2020-07-03 NOTE — PROGRESS NOTES
CM acknowledged discharge order. CRISTI  Home  Follow up appointments to be scheduled by patient. Offices closed due to holiday. CM tasks are complete. Nursing informed. 1030am  CM informed patient to be consulted for [address] Hummel RN and CM will confirm discharge plan. Chart review - CM noted discharge order cancelled in chart. Sharon Godfrey has updated CM and nursing. Life Vest has been ordered. Patient can be discharged 7/4/2020. Life Vest delivery will not delay discharge. 6806  Patient will be on [facility] at discharge. [employer] card provided to nursing. Nursing calling [facility] with clarification on scripts. Scripts being picked up by son prior to discharge. Enriqueta at [facility] has requested [employer] cards to be faxed. [phone]  Copy of Brillinta card faxed. Hard copies provided to patient.      Saritha Marsh RN CM  Ext [phone]

## 2020-07-03 NOTE — PROGRESS NOTES
Hospitalist Progress Note    NAME: Jeniffer Romero   :  1932   MRN:  [id number]       Assessment / Plan:  NSTEMI  Acute on chronic CHF w pulm edema  -Continue ASA, statin, BB, ACEI, Brilinta  -2D echo noted, LVEF 20-25%. To be set up for [place] [address] cardiology  -Continue diuresis with IV Lasix  -Hypoxia, POA, resolved  -Strict I's/O, daily weights  -COVID testing negative  -CTA chest negative for PE    ?CAP  -Chest x-ray right greater than left pneumonia versus asymmetric edema  -procal < 0.05, lower suspicion for bacterial pneumonia  -Started empiric Augmentin but will DC for now and monitor    Hypertension  -BP, switch to Coreg, ACEI as above  -Continue amlodipine, stop HCTZ    Hyperglycemia POA  -Resolved. A1c 6.2  -? Stress reaction, unclear etiology    Hyperthyroidism  --continue methimazole. Check TSH     Hx frequent fall  --fall precaution     Body mass index is 21.46 kg/m².     Code: discussed, wants full code  DVT prophylaxis: lovenox  Surrogate decision maker:  Pilo Shell     Addendum:  UA c/w uti, POA. Continue rocephin. Add on urine culture. Repeat trop 11 c/w NSTEMI, POA. Give lovenox 1mg/kg x 1. Notified cardiology, spoke to Omid Mckeon NP. Subjective:     Chief Complaint / Reason for Physician Visit  SOB resolved, resting in bed comfortably. No acute complaints    Discussed with RN events overnight. Review of Systems:  Symptom Y/N Comments  Symptom Y/N Comments   Fever/Chills n   Chest Pain n    Poor Appetite n   Edema n    Cough n   Abdominal Pain     Sputum n   Joint Pain     SOB/CORTES n   Pruritis/Rash     Nausea/vomit n   Tolerating PT/OT     Diarrhea n   Tolerating Diet     Constipation n   Other       Could NOT obtain due to:      Objective:     VITALS:   Last 24hrs VS reviewed since prior progress note.  Most recent are:  Patient Vitals for the past 24 hrs:   Temp Pulse Resp BP SpO2   20 1253  70  103/59    20 1128  80  115/66 97 % 07/03/20 0938    141/88    07/03/20 0740 98.5 °F (36.9 °C) 79 18 141/88 96 %   07/03/20 0300 98.4 °F (36.9 °C) 67 17 115/73 96 %   07/03/20 0200  67  120/87 97 %   07/03/20 0100  68  119/62 97 %   07/03/20 0011 98 °F (36.7 °C) 73 17 104/87 95 %   07/02/20 2315  66  116/61 95 %   07/02/20 2305  66  138/64 97 %   07/02/20 2300  63 16 134/63 98 %   07/02/20 2255  67 16 136/72 99 %   07/02/20 2250 97.7 °F (36.5 °C) 69 16 128/60 97 %   07/02/20 2247  72 16 129/68 96 %   07/02/20 2245  66 16 130/62 99 %   07/02/20 2230  63 16 135/60 99 %   07/02/20 2215  76 16 139/79 93 %   07/02/20 2200  (!) 59 16 124/57 98 %   07/02/20 2145  (!) 57 16 132/58 97 %   07/02/20 2130  64 16 122/64 98 %   07/02/20 2115  61 16 125/56 98 %   07/02/20 2100  73 16 128/62 94 %   07/02/20 2045  63 16 125/64 98 %   07/02/20 2030  77 16  97 %   07/02/20 2015  83 16     07/02/20 2000  85 16 131/70 94 %   07/02/20 1945  82 16 133/55 94 %   07/02/20 1930  81 16 143/81 96 %   07/02/20 1915  86 16 149/67 96 %   07/02/20 1900 98.1 °F (36.7 °C) 83 16 138/78 98 %   07/02/20 1845  93   96 %   07/02/20 1830  85   94 %   07/02/20 1815  88   95 %   07/02/20 1800  78   96 %   07/02/20 1745  (!) 127  122/69 94 %   07/02/20 1730  83  127/67 93 %   07/02/20 1715 98.1 °F (36.7 °C) 70 18 118/65 95 %       Intake/Output Summary (Last 24 hours) at 7/3/2020 1309  Last data filed at 7/2/2020 1350  Gross per 24 hour   Intake    Output 400 ml   Net -400 ml        PHYSICAL EXAM:  General: WD, WN. Alert, cooperative, no acute distress    EENT:  EOMI. Anicteric sclerae. MMM  Resp:  Bibasilar rales, no wheeze, upper fields clear. No accessory muscle use  CV:  Regular  rhythm,  mild b/l LE edema  GI:  Soft, Non distended, Non tender.  +Bowel sounds  Neurologic:  Alert and oriented X 3, normal speech,   Psych:   Good insight. Not anxious nor agitated  Skin:  No rashes.   No jaundice    Reviewed most current lab test results and cultures  YES  Reviewed most current radiology test results   YES  Review and summation of old records today    NO  Reviewed patient's current orders and MAR    YES  PMH/SH reviewed - no change compared to H&P  ________________________________________________________________________  Care Plan discussed with:    Comments   Patient x    Family      RN x    Care Manager     Consultant                       x Multidiciplinary team rounds were held today with , nursing, pharmacist and clinical coordinator. Patient's plan of care was discussed; medications were reviewed and discharge planning was addressed. ________________________________________________________________________  Total NON critical care TIME: 35   Minutes    Total CRITICAL CARE TIME Spent:   Minutes non procedure based      Comments   >50% of visit spent in counseling and coordination of care x    ________________________________________________________________________  Nas Arms, DO     Procedures: see electronic medical records for all procedures/Xrays and details which were not copied into this note but were reviewed prior to creation of Plan. LABS:  I reviewed today's most current labs and imaging studies.   Pertinent labs include:  Recent Labs     07/03/20 0520 07/02/20 0337 07/01/20  0918   WBC 6.9 9.6 8.7   HGB 13.5 12.6 13.1   HCT 41.0 40.5 42.5    186 219     Recent Labs     07/03/20 0520 07/02/20  0337 07/01/20  2215 07/01/20  0918    142  --  143   K 3.7 4.3  --  3.3*    109*  --  110*   CO2 30 26  --  23   * 90  --  367*   BUN 24* 24*  --  20   CREA 0.76 0.82  --  1.09*   CA 9.0 9.1  --  8.6   MG  --   --  1.9  --    ALB  --   --   --  3.2*   TBILI  --   --   --  0.7   ALT  --   --   --  38       Signed: Vince Salazar, DO

## 2020-07-03 NOTE — PROGRESS NOTES
[address] [place] [address]  [phone]      Cardiology Progress Note      7/3/2020 1002 AM    Admit Date: 7/1/2020    Admit Diagnosis:   Acute respiratory failure with hypoxia (HCC) [J96.01]  Pulmonary edema [J81.1]  NSTEMI (non-ST elevated myocardial infarction) ([facility] [address] [I21.4]    Interval History/Subjective:     Aurelio Dalton is a 80 y.o. female with PMH PVD, MI, HTN, CAD, HLD, angina who was admitted for Acute respiratory failure with hypoxia ([facility] [address] [J96.01]  Pulmonary edema [J81.1]  NSTEMI (non-ST elevated myocardial infarction) ([facility] [address] [I21.4]. -VSS  -trop peak 12.4, down to 11.00;  CXR no sig change  -Echo completed today. EF 20-25%  -no weight; I/O incomplete  -Ms. Moisés Aburto is feeling well.   She denies any further chest pain and she feels like her breathing is back to normal.    Visit Vitals  /88   Pulse 79   Temp 98.5 °F (36.9 °C)   Resp 18   Ht 5' 4\" (1.626 m)   Wt 54 kg (119 lb)   SpO2 96%   BMI 20.43 kg/m²       Current Facility-Administered Medications   Medication Dose Route Frequency    lisinopriL (PRINIVIL, ZESTRIL) tablet 2.5 mg  2.5 mg Oral DAILY    ticagrelor (BRILINTA) tablet 90 mg  90 mg Oral Q12H    furosemide (LASIX) tablet 40 mg  40 mg Oral DAILY    aspirin delayed-release tablet 81 mg  81 mg Oral DAILY    sodium chloride (NS) flush 5-40 mL  5-40 mL IntraVENous Q8H    sodium chloride (NS) flush 5-40 mL  5-40 mL IntraVENous PRN    acetaminophen (TYLENOL) tablet 650 mg  650 mg Oral Q6H PRN    ondansetron (ZOFRAN) injection 4 mg  4 mg IntraVENous Q6H PRN    docusate sodium (COLACE) capsule 100 mg  100 mg Oral BID    glucose chewable tablet 16 g  4 Tab Oral PRN    dextrose (D50W) injection syrg 12.5-25 g  12.5-25 g IntraVENous PRN    glucagon (GLUCAGEN) injection 1 mg  1 mg IntraMUSCular PRN    insulin lispro (HUMALOG) injection   SubCUTAneous AC&HS    methIMAzole (TAPAZOLE) tablet 5 mg  5 mg Oral EVERY OTHER DAY    metoprolol tartrate (LOPRESSOR) tablet 50 mg  50 mg Oral BID    atorvastatin (LIPITOR) tablet 20 mg  20 mg Oral QHS    potassium chloride SR (KLOR-CON 10) tablet 20 mEq  20 mEq Oral DAILY       Objective:      Physical Exam:  General: pleasant, elderly AAF resting in bed in NAD  Heart: S1S2, +murmur, no JVD, No S3  Lungs: clear throughout, RA  Abdomen: Soft, NTND, +BS  Extremities: no edema, DP/PT palpable   Neurologic: Grossly normal, generalized weakness      Data Review:   Recent Labs     07/03/20 0520 07/02/20 0337 07/01/20 0918   WBC 6.9 9.6 8.7   HGB 13.5 12.6 13.1   HCT 41.0 40.5 42.5    186 219     Recent Labs     07/03/20 0520 07/02/20 0337 07/01/20 2215 07/01/20 0918    142  --  143   K 3.7 4.3  --  3.3*    109*  --  110*   CO2 30 26  --  23   * 90  --  367*   BUN 24* 24*  --  20   CREA 0.76 0.82  --  1.09*   CA 9.0 9.1  --  8.6   MG  --   --  1.9  --    ALB  --   --   --  3.2*   TBILI  --   --   --  0.7   ALT  --   --   --  38       Recent Labs     07/02/20 0337 07/01/20 2215 07/01/20 1422 07/01/20  0918   TROIQ 11.00* 12.40* 11.50* 0.21*   CPK  --   --   --  109   CKMB  --   --   --  2.6         Intake/Output Summary (Last 24 hours) at 7/3/2020 1002  Last data filed at 7/2/2020 1350  Gross per 24 hour   Intake    Output 400 ml   Net -400 ml        Telemetry: SR  ECG: ST;  Flattened t waves lateral leads  Echocardiogram: EF 20-25% preliminary read   CXRAY: \"Findings suggestive of congestive change. Developing infiltration cannot be excluded. \"  CTA chest: \"1. Negative for pulmonary embolus. 2. Mild bilateral pleural effusions, greater on the right. 3. Extensive bilateral airspace disease. While significant edema and atelectasis  could have this appearance, findings are worrisome for infection. A viral  infection could have this appearance. \"    Assessment:     Active Problems:    Coronary atherosclerosis of native coronary artery (4/27/2012)      Pulmonary edema (7/1/2020)      NSTEMI (non-ST elevated myocardial infarction) ([facility] [address] (7/1/2020)      Acute respiratory failure with hypoxia ([facility] [address] (7/1/2020)      S/P cardiac cath (7/2/2020)      Overview: 7/2/20:  EDUARDO to LAD x2 and Cx        Plan:     Acute respiratory failure/pulmonary edema/Mixed CM/possible PNA:  Improved. Patient no longer SOB. CXR today states no change and appears as PNA, however. · Covid swab came back negative   · Continue diuresis - Lasix 40 mg daily PO dose tomorrow  · ECHO EF 20-25%  · Will consult for lifevest today  · Continue BB (switched to Coreg 6.25 mg PO BID) and ACEi      NSTEMI; S/P EDUARDO/PCI to LAD x 2 and Cx: Troponin peaked at 12.4. No further chest pain. Has CAD history  · ASA, Statin, BB, ACEi, Brillinta  · Had lovenox yesterday evening after trop started increasing  · ECHO 20-25%  · Now that covid swab negative      HTN: stable  · Continue BB (switched to coreg), ACEi, continue amplodipine at discharge   · Stop taking HCTZ      Sangeeta Sage, FAITH  MSN, RN, [facility]    7/3/2020         Patient seen, examined by me personally. Plan discussed as detailed. Agree with note as outlined by  NP. I confirm findings in history and physical exam. No additional findings noted. Agree with plan as outlined above. EF low, will optimize medical treatment. [address] tomorrow.     Faiza Henry MD

## 2020-07-03 NOTE — PROGRESS NOTES
[facility]  Home  Follow up appointments    Reason for Admission:   Acute Respiratory falure poa due to acute pulmonary edema w diffuse bilateral oppaciities, NSTEMI poa                   RUR Score:          10           Plan for utilizing home health:      [facility] is not being offered at this time. Not homebound    PCP: First and Last name:    Zainab Tripathi MD   Name of Practice:    Are you a current patient: Yes/No: Yes   Approximate date of last visit:   Not sure   Can you participate in a virtual visit with your PCP:   Not sure                    Current Advanced Directive/Advance Care Plan: Son is Lawrence Franz and only adult child. Olga Lidia Hannah  [phone]                         CM met with patient to discuss discharge planning. Pt name and  confirmed as pt identifiers. Demographics confirmed. ADL's/IADL's - independent pta to include steps. Patient reports using a BSC during the day and son empties for her. Bathroom is upstairs. Patient has bedroom, kitchen and living room on first floor. [facility] [place] [facility]  Preferred Rx - [facility]  PCP Verified by CM: Yes  Mode of Transport at Discharge: Other (see comment)(son will provide transport  Olga Lidia Young - )  Transition of Care Consult (CM Consult): Discharge Planning  Physical Therapy Consult: No  Occupational Therapy Consult: No  Speech Therapy Consult: No  Current Support Network: Lives Alone(2 story home. Primarily uses 1st floor only.  )  Confirm Follow Up Transport: Family  Discharge Location  Discharge Placement: Home    Son will provide transportation at time of discharge.     Raina Adamson RN CM  Ext [phone]

## 2020-07-03 NOTE — PROGRESS NOTES
7/3/2020 1452: Order sent to Radha Denis for approval. Discussed patient with lifevest coordinator Rosmery. All paperwork sent via e-mail. Patient and son educated concerning new diagnosis of cardiomyopathy and need for lifevest. Patient can be discharged with or without fitting for lifevest in hospital. They will follow-up at home if discharged prior to authorization.   Trish De La Fuente [id number]  MSN,RN,AG-ACNP-BC

## 2020-07-03 NOTE — ROUTINE PROCESS
Spoke with Enriqueta pharmacist with [facility] who wanted to verify the prescription for Lasix 40mg PO daily.

## 2020-07-03 NOTE — PROGRESS NOTES
Pharmacy Automatic Renal Dosing Protocol - Antimicrobials  Indication for Antimicrobials: CAP    Current Regimen of Each Antimicrobial:  Augmentin 500mg po q 12h  (Start Date 7/3; Day # 1 of 5)    Previous Antimicrobial Therapy:    Significant Cultures:    urine = NG = FINAL    Radiology / Imaging results: (X-ray, CT scan or MRI):     Paralysis, amputations, malnutrition: Na    Labs:  Recent Labs     20  0520 20  0337 20  0918   CREA 0.76 0.82 1.09*   BUN 24* 24* 20   WBC 6.9 9.6 8.7     Temp (24hrs), Av.1 °F (36.7 °C), Min:97.7 °F (36.5 °C), Max:98.5 °F (36.9 °C)    Creatinine Clearance (mL/min) or Dialysis:  45    Impression/Plan:   · Continue Augmentin as above, appropriate  · Antimicrobial stop date: 5 days     Pharmacy will follow daily and adjust medications as appropriate for renal function and/or serum levels.     Thank you,  Valentina Kanner, [facility]

## 2020-07-04 VITALS
BODY MASS INDEX: 19.96 KG/M2 | HEIGHT: 64 IN | DIASTOLIC BLOOD PRESSURE: 61 MMHG | WEIGHT: 116.9 LBS | OXYGEN SATURATION: 98 % | RESPIRATION RATE: 17 BRPM | TEMPERATURE: 97.8 F | SYSTOLIC BLOOD PRESSURE: 107 MMHG | HEART RATE: 76 BPM

## 2020-07-04 LAB
GLUCOSE BLD STRIP.AUTO-MCNC: 120 MG/DL (ref 65–100)
GLUCOSE BLD STRIP.AUTO-MCNC: 139 MG/DL (ref 65–100)
SERVICE CMNT-IMP: ABNORMAL
SERVICE CMNT-IMP: ABNORMAL

## 2020-07-04 PROCEDURE — 74011250637 HC RX REV CODE- 250/637: Performed by: HOSPITALIST

## 2020-07-04 PROCEDURE — 82962 GLUCOSE BLOOD TEST: CPT

## 2020-07-04 PROCEDURE — 74011250637 HC RX REV CODE- 250/637: Performed by: NURSE PRACTITIONER

## 2020-07-04 RX ADMIN — TICAGRELOR 90 MG: 90 TABLET ORAL at 04:39

## 2020-07-04 RX ADMIN — POTASSIUM CHLORIDE 20 MEQ: 750 TABLET, FILM COATED, EXTENDED RELEASE ORAL at 08:38

## 2020-07-04 RX ADMIN — LISINOPRIL 2.5 MG: 5 TABLET ORAL at 08:39

## 2020-07-04 RX ADMIN — ASPIRIN 81 MG: 81 TABLET, COATED ORAL at 08:38

## 2020-07-04 RX ADMIN — CARVEDILOL 6.25 MG: 6.25 TABLET, FILM COATED ORAL at 08:38

## 2020-07-04 RX ADMIN — FUROSEMIDE 40 MG: 40 TABLET ORAL at 08:39

## 2020-07-04 NOTE — PROGRESS NOTES
Cardiology Progress Note      7/4/2020 11:38 AM    Admit Date: 7/1/2020    Admit Diagnosis: Acute respiratory failure with hypoxia (HCC) [J96.01];Pulmonary edema [J81.1];NSTEMI (non-ST elevated myocardial infarction) ([facility] [address] [I21.4]      Subjective:     José Luis Mcfarlane denies any chest pain, SOB. Visit Vitals  /61   Pulse 76   Temp 97.8 °F (36.6 °C)   Resp 17   Ht 5' 4\" (1.626 m)   Wt 116 lb 14.4 oz (53 kg)   SpO2 98%   BMI 20.07 kg/m²       Current Facility-Administered Medications   Medication Dose Route Frequency    carvediloL (COREG) tablet 6.25 mg  6.25 mg Oral BID    lisinopriL (PRINIVIL, ZESTRIL) tablet 2.5 mg  2.5 mg Oral DAILY    ticagrelor (BRILINTA) tablet 90 mg  90 mg Oral Q12H    furosemide (LASIX) tablet 40 mg  40 mg Oral DAILY    aspirin delayed-release tablet 81 mg  81 mg Oral DAILY    sodium chloride (NS) flush 5-40 mL  5-40 mL IntraVENous Q8H    sodium chloride (NS) flush 5-40 mL  5-40 mL IntraVENous PRN    acetaminophen (TYLENOL) tablet 650 mg  650 mg Oral Q6H PRN    ondansetron (ZOFRAN) injection 4 mg  4 mg IntraVENous Q6H PRN    docusate sodium (COLACE) capsule 100 mg  100 mg Oral BID    glucose chewable tablet 16 g  4 Tab Oral PRN    dextrose (D50W) injection syrg 12.5-25 g  12.5-25 g IntraVENous PRN    glucagon (GLUCAGEN) injection 1 mg  1 mg IntraMUSCular PRN    insulin lispro (HUMALOG) injection   SubCUTAneous AC&HS    atorvastatin (LIPITOR) tablet 20 mg  20 mg Oral QHS    potassium chloride SR (KLOR-CON 10) tablet 20 mEq  20 mEq Oral DAILY         Objective:      Physical Exam:  Visit Vitals  /61   Pulse 76   Temp 97.8 °F (36.6 °C)   Resp 17   Ht 5' 4\" (1.626 m)   Wt 116 lb 14.4 oz (53 kg)   SpO2 98%   BMI 20.07 kg/m²     General Appearance:  Well developed, well nourished,alert and oriented x 3, and individual in no acute distress. Ears/Nose/Mouth/Throat:   Hearing grossly normal.         Neck: Supple. Chest:   Lungs clear to auscultation bilaterally. Cardiovascular:  Regular rate and rhythm, S1, S2 normal, no murmur. Abdomen:   Soft, non-tender, bowel sounds are active. Extremities: No edema bilaterally. Skin: Warm and dry. Data Review:   Labs:    Recent Results (from the past 24 hour(s))   GLUCOSE, POC    Collection Time: 07/03/20 12:19 PM   Result Value Ref Range    Glucose (POC) 143 (H) 65 - 100 mg/dL    Performed by Fabienne Garcia, POC    Collection Time: 07/03/20  4:51 PM   Result Value Ref Range    Glucose (POC) 122 (H) 65 - 100 mg/dL    Performed by Fabienne Garcia, POC    Collection Time: 07/03/20  8:37 PM   Result Value Ref Range    Glucose (POC) 123 (H) 65 - 100 mg/dL    Performed by Ligia Barros    GLUCOSE, POC    Collection Time: 07/04/20  7:20 AM   Result Value Ref Range    Glucose (POC) 120 (H) 65 - 100 mg/dL    Performed by Trish Todd, POC    Collection Time: 07/04/20 11:16 AM   Result Value Ref Range    Glucose (POC) 139 (H) 65 - 100 mg/dL    Performed by Ester Tobar        Telemetry: normal sinus rhythm      Assessment:     Hospital Problems  Date Reviewed: 2/11/2020          Codes Class Noted POA    Cardiomyopathy ([facility] [address] ICD-10-CM: I42.9  ICD-9-CM: 425.4  7/3/2020 Unknown        S/P cardiac cath ICD-10-CM: Z98.890  ICD-9-CM: V45.89  7/2/2020 Unknown    Overview Signed 7/2/2020  4:56 PM by Delmer Massey NP     7/2/20:  EDUARDO to LAD x2 and Cx             Pulmonary edema ICD-10-CM: J81.1  ICD-9-CM: 820  7/1/2020 Unknown        NSTEMI (non-ST elevated myocardial infarction) ([facility] [address] ICD-10-CM: I21.4  ICD-9-CM: 410.70  7/1/2020 Unknown        Acute respiratory failure with hypoxia [facility] ICD-10-CM: J96.01  ICD-9-CM: 518.81  7/1/2020 Unknown        Coronary atherosclerosis of native coronary artery ICD-10-CM: I25.10  ICD-9-CM: 414.01  4/27/2012 Yes              Plan:     Continue current therapy. BP low to increase meds. Can go home after lifevest teaching.     Nicol Barahona MD KAELYN

## 2020-07-04 NOTE — DISCHARGE SUMMARY
Hospitalist Discharge Summary     Patient ID:  Coni Anders  [id number]  30 y.o.  8/18/1932 7/1/2020    PCP on record: Korin Silverio MD    Admit date: 7/1/2020  Discharge date and time: 7/4/2020    DISCHARGE DIAGNOSIS:    See below    CONSULTATIONS:  IP CONSULT TO CARDIOLOGY    Excerpted HPI from H&P of Mark Anthony Breen MD:  Coni Anders is a 80 y.o. female with PMH HTN, hx CAD with prior MI and stent in 2008 per patient brought in to ER by son for SOB. Patient's O2 sat was 53% RA and she was placed on bipap. She reports onset of SOB this morning after waking up and getting ready for appointment with endocrinologist, associated with nonproductive cough and lower mid sternal and epigastric chest pain, had nausea and vomited a small amount x 1, with sweats. Pain was sharp but mild. Chest pain has resolved.     She was noted to have JVD and diffuse crackles. Given lasix 40mg IV x 1 and nitropaste. She has been voiding well and now feels much better, has been taken off bipap prior to my evaluation. ______________________________________________________________________  DISCHARGE SUMMARY/HOSPITAL COURSE:  for full details see H&P, daily progress notes, labs, consult notes. NSTEMI  Acute on chronic CHF w pulm edema  -s/p PCI with EDUARDO to LAD x 2 and Cx  -Continue ASA, statin, BB, ACEI, Brilinta  -2D echo noted, LVEF 20-25%. -has life vest and completed life vest teaching prior to [address] [place] [address] cardiology  -s/p diuresis w IV lasix  -Hypoxia, POA, resolved  -COVID testing negative  -CTA chest negative for PE     ?CAP  -Chest x-ray right greater than left pneumonia versus asymmetric edema  -procal < 0.05, lower suspicion for bacterial pneumonia  -Started empiric Augmentin but will DC for now and monitor     Hypertension  -BP, switch to Coreg, ACEI as above  -Continue amlodipine, stop HCTZ     Hyperglycemia POA  -Resolved. A1c 6.2  -? Stress reaction, unclear etiology     Hyperthyroidism  --continue methimazole.  Check TSH     Hx frequent fall  --fall precaution     Body mass index is 21.46 kg/m².    _______________________________________________________________________  Patient seen and examined by me on discharge day. Pertinent Findings:  Gen:    Not in distress  Chest: Clear lungs  CVS:   Regular rhythm. No edema  Abd:  Soft, not distended, not tender  Neuro:  Alert, oriented x 3  _______________________________________________________________________  DISCHARGE MEDICATIONS:   Current Discharge Medication List      START taking these medications    Details   carvediloL (Coreg) 6.25 mg tablet Take 1 Tab by mouth two (2) times daily (with meals). Qty: 60 Tab, Refills: 11      potassium chloride SR (K-TAB) 20 mEq tablet Take 1 Tab by mouth daily. Qty: 30 Tab, Refills: 11      furosemide (LASIX) 40 mg tablet New diagnosis, ICM. Qty: 30 Tab, Refills: 11      ticagrelor (BRILINTA) 90 mg tablet Take 1 Tab by mouth every twelve (12) hours every twelve (12) hours. Qty: 60 Tab, Refills: 11         CONTINUE these medications which have CHANGED    Details   simvastatin (ZOCOR) 40 mg tablet Take 0.5 Tabs by mouth nightly. Qty: 30 Tab, Refills: 11         CONTINUE these medications which have NOT CHANGED    Details   amLODIPine (NORVASC) 2.5 mg tablet Take 1 Tab by mouth daily. Qty: 90 Tab, Refills: 3      lisinopril (PRINIVIL, ZESTRIL) 20 mg tablet Take 20 mg by mouth daily. cholecalciferol, vitamin D3, (VITAMIN D3) 2,000 unit Tab Take 1 Cap by mouth daily. omega-3 fatty acids-vitamin e (FISH OIL) 1,000 mg Cap Take 2 Caps by mouth. Associated Diagnoses: Coronary atherosclerosis of native coronary artery; Acute myocardial infarction of other inferior wall, subsequent episode of care; Mixed hyperlipidemia; Postsurgical percutaneous transluminal coronary angioplasty status      aspirin 81 mg tablet Take 81 mg by mouth daily.       multivitamin (ONE A DAY) tablet Take 1 Tab by mouth daily. cyanocobalamin (VITAMIN B-12) 1,000 mcg tablet Take 1,000 mcg by mouth daily. Associated Diagnoses: Mixed hyperlipidemia      valACYclovir (VALTREX) 500 mg tablet Take 500 mg by mouth as needed. Associated Diagnoses: SOB (shortness of breath) on exertion         STOP taking these medications       methimazole (TAPAZOLE) 5 mg tablet Comments:   Reason for Stopping:         metoprolol (LOPRESSOR) 50 mg tablet Comments:   Reason for Stopping:                 Patient Follow Up Instructions: Activity: Activity as tolerated  Diet: Resume previous diet    Follow-up as below    Follow-up Information     Follow up With Specialties Details Why Contact Info    Sukhi Root MD Family Practice Schedule an appointment as soon as possible for a visit [facility] - hospital follow up. Please call to schedule for 1-2 weeks from discharge. [address]  [phone]      [address] [place] [address]  In 2 weeks [facility] - hospital follow up. Please call to schedule.  [address] [facility]  [phone]        ________________________________________________________________    Risk of deterioration: Moderate    Condition at Discharge:  Stable  __________________________________________________________________    Disposition  Home with family, no needs    ____________________________________________________________________    Code Status: Full Code  ___________________________________________________________________      Total time in minutes spent coordinating this discharge (includes going over instructions, follow-up, prescriptions, and preparing report for sign off to her PCP) :  35 minutes    Signed:  Paul Tellez DO

## 2020-07-06 ENCOUNTER — PATIENT OUTREACH (OUTPATIENT)
Dept: FAMILY MEDICINE CLINIC | Age: 85
End: 2020-07-06

## 2020-07-06 NOTE — PROGRESS NOTES
Patient contacted regarding recent discharge and COVID-19 risk. Discussed COVID-19 related testing which was available at this time. Test results were negative. Patient informed of results, if available? yes    Care Transition Nurse/ Ambulatory Care Manager contacted the caregiver, s/w Sergei Pineda, son who is caregiver, by telephone to perform post discharge assessment. Verified name and  with caregiver as identifiers. Patient has following risk factors of: heart failure and acute respiratory failure. CTN/ACM reviewed discharge instructions, medical action plan and red flags related to discharge diagnosis. Reviewed and educated them on any new and changed medications related to discharge diagnosis. Advised obtaining a 90-day supply of all daily and as-needed medications. Per Mr Kika Herrera, he has picked up all new medications for patient and she is taking as prescribed. Education provided regarding infection prevention, and signs and symptoms of COVID-19 and when to seek medical attention with caregiver who verbalized understanding. Discussed exposure protocols and quarantine from [address] you at higher risk for severe illness  and given an opportunity for questions and concerns. The caregiver agrees to contact the COVID-19 hotline [phone] or PCP office for questions related to their healthcare. CTN/ACM provided contact information for future reference. From CDC: Are you at higher risk for severe illness?  Wash your hands often.  Avoid close contact (6 feet, which is about two arm lengths) with people who are sick.  Put distance between yourself and other people if COVID-19 is spreading in your community.  Clean and disinfect frequently touched surfaces.  Avoid all cruise travel and non-essential air travel.  Call your healthcare professional if you have concerns about COVID-19 and your underlying condition or if you are sick.     For more information on steps you can take to protect yourself, see CDC's How to Protect Yourself      Patient/family/caregiver given information for GetWell Loop and agrees to enroll yes  Patient's preferred e-mail:  [email or web address]  Patient's preferred phone number: [phone]  Based on Loop alert triggers, patient will be contacted by nurse care manager for worsening symptoms. Pt will be further monitored by COVID Loop Team based on severity of symptoms and risk factors.  DMB

## 2020-07-15 ENCOUNTER — OFFICE VISIT (OUTPATIENT)
Dept: CARDIOLOGY CLINIC | Age: 85
End: 2020-07-15

## 2020-07-15 VITALS
RESPIRATION RATE: 16 BRPM | HEART RATE: 91 BPM | BODY MASS INDEX: 20.08 KG/M2 | WEIGHT: 117.6 LBS | OXYGEN SATURATION: 98 % | DIASTOLIC BLOOD PRESSURE: 60 MMHG | SYSTOLIC BLOOD PRESSURE: 126 MMHG | HEIGHT: 64 IN

## 2020-07-15 DIAGNOSIS — E78.2 MIXED HYPERLIPIDEMIA: ICD-10-CM

## 2020-07-15 DIAGNOSIS — I21.4 NSTEMI (NON-ST ELEVATED MYOCARDIAL INFARCTION) (HCC): ICD-10-CM

## 2020-07-15 DIAGNOSIS — I25.10 ATHEROSCLEROSIS OF NATIVE CORONARY ARTERY OF NATIVE HEART WITHOUT ANGINA PECTORIS: Primary | ICD-10-CM

## 2020-07-15 DIAGNOSIS — Z98.61 POSTSURGICAL PERCUTANEOUS TRANSLUMINAL CORONARY ANGIOPLASTY STATUS: ICD-10-CM

## 2020-07-15 DIAGNOSIS — I25.5 ISCHEMIC CARDIOMYOPATHY: ICD-10-CM

## 2020-07-15 RX ORDER — SACUBITRIL AND VALSARTAN 24; 26 MG/1; MG/1
1 TABLET, FILM COATED ORAL 2 TIMES DAILY
Qty: 60 TAB | Refills: 1 | Status: SHIPPED | OUTPATIENT
Start: 2020-07-15 | End: 2020-09-16

## 2020-07-15 NOTE — PROGRESS NOTES
1. Have you been to the ER, urgent care clinic since your last visit? Hospitalized since your last visit? Yes When: on 7/2/20 for [facility]    2. Have you seen or consulted any other health care providers outside of the [address] [place] [address] since your last visit? Include any pap smears or colon screening. No    Chief Complaint   Patient presents with   [facility] Follow Up     s/p LHC     Pt denies complaints today.

## 2020-07-15 NOTE — PROGRESS NOTES
[facility] @ [facility]    Nadine Lansing HERNANDEZ, ANP-BC  Subjective/HPI:     Anitha Mcintosh is a 80 y.o. female is here for transition of care visit: She was admitted 7/1/2020 for acute onset of shortness of breath, presented hypoxic to the ER with flash pulmonary edema, events preceeded with chest pain. Marked troponin elevation she was taken to the cath lab after COVID was ruled out and found to have multivessel CAD. Underwent PRCA stenting to the Prox/Mid LAD and mid circumflex artery. EF the following day on ECHO 15-20%. Prior to admission was on lopressor and ACEI, she was D/C with Life Vest and lopressor changed to Coreg. .           7/2/2020 Cardiac Cath w/ Intervention:  Conclusion     · SUCCESSFUL PCI OF PROX AND MID LAD AND MID CX  · MILD LV DYSFUNCTION      Coronary Findings     Diagnostic   Dominance: Right   Left Main    The vessel was visualized by angiography. The vessel is angiographically normal.    Left Anterior Descending    Prox LAD lesion, 99% stenosed. Mid LAD lesion, 85% stenosed. Left Circumflex    Mid Cx lesion, 80% stenosed. Right Coronary Artery    The vessel exhibits minimal luminal irregularities. 7/3/2020 ECHO:  Echo Findings     Left Ventricle  Mildly dilated left ventricle. Asymmetric predominantly basal septal, ovoid-shaped cavity hypertrophy. No asymmetric gradient noted. Moderate-to-severe systolic dysfunction. The estimated ejection fraction is 15 - 20%. There is left ventricular diastolic dysfunction. Left Atrium  Normal cavity size. No atrial septal defect present. No patent foramen ovale visualized. Right Ventricle  Normal cavity size and global systolic function. Right Atrium  Normal cavity size. Aortic Valve  Normal valve structure, trileaflet valve structure, no stenosis and no regurgitation. Mitral Valve  No stenosis. Leaflet calcification. Mild mitral annular calcification. Trace regurgitation.     Tricuspid Valve Normal valve structure and no stenosis. Moderate to severe regurgitation. Pulmonic Valve  Pulmonic valve not well visualized, but normal doppler findings. Trace regurgitation. IVC/Hepatic Veins  Normal structure. Normal central venous pressure (0-5 mmHg); IVC diameter is less than 21 mm and collapses more than 50% with respiration. Pericardium  Trivial-to-small pericardial effusion noted. Pericardial fat pad present. PCP Provider  Collin Edge MD  Past Medical History:   Diagnosis Date    Abnormality of gait     Essential hypertension, benign     Old myocardial infarction     Other acute and subacute form of ischemic heart disease     Peripheral vascular disease, unspecified ([facility] [address]     Postsurgical percutaneous transluminal coronary angioplasty status 4/27/2012      Past Surgical History:   Procedure Laterality Date    HX BREAST BIOPSY Bilateral     x2 benign 10+ years ago     No Known Allergies   No family history on file. Current Outpatient Medications   Medication Sig    carvediloL (Coreg) 6.25 mg tablet Take 1 Tab by mouth two (2) times daily (with meals).  potassium chloride SR (K-TAB) 20 mEq tablet Take 1 Tab by mouth daily.  furosemide (LASIX) 40 mg tablet New diagnosis, ICM.  simvastatin (ZOCOR) 40 mg tablet Take 0.5 Tabs by mouth nightly.  ticagrelor (BRILINTA) 90 mg tablet Take 1 Tab by mouth every twelve (12) hours every twelve (12) hours.  amLODIPine (NORVASC) 2.5 mg tablet Take 1 Tab by mouth daily.  lisinopril (PRINIVIL, ZESTRIL) 20 mg tablet Take 20 mg by mouth daily.  multivitamin (ONE A DAY) tablet Take 1 Tab by mouth daily.  cyanocobalamin (VITAMIN B-12) 1,000 mcg tablet Take 1,000 mcg by mouth daily.  valACYclovir (VALTREX) 500 mg tablet Take 500 mg by mouth as needed.  cholecalciferol, vitamin D3, (VITAMIN D3) 2,000 unit Tab Take 1 Cap by mouth daily.  omega-3 fatty acids-vitamin e (FISH OIL) 1,000 mg Cap Take 2 Caps by mouth.     aspirin 81 mg tablet Take 81 mg by mouth daily. No current facility-administered medications for this visit. Vitals:    07/15/20 1459 07/15/20 1510   BP: 126/62 126/60   Pulse: 91    Resp: 16    SpO2: 98%    Weight: 117 lb 9.6 oz (53.3 kg)    Height: 5' 4\" (1.626 m)      Social History     Socioeconomic History    Marital status: SINGLE     Spouse name: Not on file    Number of children: Not on file    Years of education: Not on file    Highest education level: Not on file   Occupational History    Not on file   Social Needs    Financial resource strain: Not on file    Food insecurity     Worry: Not on file     Inability: Not on file    Transportation needs     Medical: Not on file     Non-medical: Not on file   Tobacco Use    Smoking status: Never Smoker    Smokeless tobacco: Never Used   Substance and Sexual Activity    Alcohol use: No    Drug use: No    Sexual activity: Not on file   Lifestyle    Physical activity     Days per week: Not on file     Minutes per session: Not on file    Stress: Not on file   Relationships    Social connections     Talks on phone: Not on file     Gets together: Not on file     Attends Anabaptism service: Not on file     Active member of club or organization: Not on file     Attends meetings of clubs or organizations: Not on file     Relationship status: Not on file    Intimate partner violence     Fear of current or ex partner: Not on file     Emotionally abused: Not on file     Physically abused: Not on file     Forced sexual activity: Not on file   Other Topics Concern    Not on file   Social History Narrative    Not on file       I have reviewed the nurses notes, vitals, problem list, allergy list, medical history, family, social history and medications.     Review of Symptoms:  11 systems reviewed, negative other than as stated in the HPI      Physical Exam:      General: Well developed, in no acute distress, cooperative and alert  HEENT: No carotid bruits, no JVD, trach is midline. Neck Supple, PERRL, EOM intact. Heart:  Normal S1/S2 negative S3 or S4. Regular, no murmur, gallop or rub. Respiratory: Clear bilaterally x 4, no wheezing or rales  Abdomen:   Soft, non-tender, no masses, bowel sounds are active. Extremities:  No edema, normal cap refill, no cyanosis, atraumatic. Neuro: A&Ox3, speech clear, gait stable. Skin: Skin color is normal. No rashes or lesions. Non diaphoretic  Vascular: 2+ pulses symmetric in all extremities    Cardiographics    ECG: NSR. Cardiology Labs:  Lab Results   Component Value Date/Time    Cholesterol, total 120 07/02/2020 03:37 AM    HDL Cholesterol 54 07/02/2020 03:37 AM    LDL, calculated 53.8 07/02/2020 03:37 AM    Triglyceride 61 07/02/2020 03:37 AM    CHOL/HDL Ratio 2.2 07/02/2020 03:37 AM       Lab Results   Component Value Date/Time    Sodium 140 07/03/2020 05:20 AM    Potassium 3.7 07/03/2020 05:20 AM    Chloride 105 07/03/2020 05:20 AM    CO2 30 07/03/2020 05:20 AM    Anion gap 5 07/03/2020 05:20 AM    Glucose 104 (H) 07/03/2020 05:20 AM    BUN 24 (H) 07/03/2020 05:20 AM    Creatinine 0.76 07/03/2020 05:20 AM    BUN/Creatinine ratio 32 (H) 07/03/2020 05:20 AM    GFR est AA >60 07/03/2020 05:20 AM    GFR est non-AA >60 07/03/2020 05:20 AM    Calcium 9.0 07/03/2020 05:20 AM    Bilirubin, total 0.7 07/01/2020 09:18 AM    Alk. phosphatase 90 07/01/2020 09:18 AM    Protein, total 7.6 07/01/2020 09:18 AM    Albumin 3.2 (L) 07/01/2020 09:18 AM    Globulin 4.4 (H) 07/01/2020 09:18 AM    A-G Ratio 0.7 (L) 07/01/2020 09:18 AM    ALT (SGPT) 38 07/01/2020 09:18 AM           Assessment:     Assessment:     Diagnoses and all orders for this visit:    1. Atherosclerosis of native coronary artery of native heart without angina pectoris    2. NSTEMI (non-ST elevated myocardial infarction) ([facility] [address]    3. Ischemic cardiomyopathy    4. Postsurgical percutaneous transluminal coronary angioplasty status    5.  Mixed hyperlipidemia  -     AMB POC EKG ROUTINE W/ 12 LEADS, INTER & REP        ICD-10-CM ICD-9-CM    1. Atherosclerosis of native coronary artery of native heart without angina pectoris  I25.10 414.01    2. NSTEMI (non-ST elevated myocardial infarction) ([facility] [address]  I21.4 410.70    3. Ischemic cardiomyopathy  I25.5 414.8    4. Postsurgical percutaneous transluminal coronary angioplasty status  Z98.61 V45.82    5. Mixed hyperlipidemia  E78.2 272.2 AMB POC EKG ROUTINE W/ 12 LEADS, INTER & REP     Orders Placed This Encounter    AMB POC EKG ROUTINE W/ 12 LEADS, INTER & REP     Order Specific Question:   Reason for Exam:     Answer:   routine        Plan:     1. ASHD: S/P NSTEMI 7/1/2020, Prox LAD 99%, mid LAD 85%,  Mid Circ 85%. PTCA/Stenting. Continue ASA/Brilinta, Betablocker, amlodipine and statin therapy  2. Ischemic Cardiomyopathy: Ef 15-20% has life vest, continue Coreg 6.25 mg twice a day, Lasix 40 mg daily. Will transition patient from lisinopril to [facility]. Prescription sent to pharmacy anticipate will need prior Auth. Patient was instructed to remain on lisinopril until she has the bottle of Entresto in hand and then would begin the 36-hour hold before starting Entresto. Follow-up 4 weeks after starting Entresto for titration evaluation +/- adding spironolactone. Patient advised if systolic blood pressure hits 100 or less she is to discontinue amlodipine and call my office to advise. 3. Hypertension: Controlled with current medication  4. High Cholesterol: LDL 54 on admission, continue Simvastatin 20mg daily     Follow-up in 1 month for CHF medication titration, at that time we will schedule for limited echocardiogram to evaluate ejection fraction. Carmen Shah NP      Please note that this dictation was completed with [employer], the [employer] voice recognition software. Quite often unanticipated grammatical, syntax, homophones, and other interpretive errors are inadvertently transcribed by the computer software. Please disregard these errors. Please excuse any errors that have escaped final proofreading. Thank you.

## 2020-07-15 NOTE — PATIENT INSTRUCTIONS
1.  Will change lisinopril to Entresto. Continue on the lisinopril until you have the bottle of Entresto and hand. When it comes time to start Entresto you need to stop lisinopril for 36 hours before you take Entresto. 2.  I put in a new prescription for the Brilinta, we will work on trying to get prior authorization. If it is not approved from the insurance company then we can switch to Plavix. Monitor your blood pressure daily, if the top number of the blood pressure is below 100, stop amlodipine and call my office to let me know.

## 2020-07-23 ENCOUNTER — DOCUMENTATION ONLY (OUTPATIENT)
Dept: CARDIOLOGY CLINIC | Age: 85
End: 2020-07-23

## 2020-07-27 ENCOUNTER — TELEPHONE (OUTPATIENT)
Dept: CARDIOLOGY CLINIC | Age: 85
End: 2020-07-27

## 2020-07-27 NOTE — TELEPHONE ENCOUNTER
Spoke to patient's son Nguyen Griffin on [employer] using 2 identifiers. He stated patient cannot afford Entresto. Agreed to proceed with PAP.   Will mail application form and coupon for a 30 day free trial.

## 2020-07-31 ENCOUNTER — TELEPHONE (OUTPATIENT)
Dept: CARDIOLOGY CLINIC | Age: 85
End: 2020-07-31

## 2020-07-31 NOTE — TELEPHONE ENCOUNTER
Pt wanting to know about a coupon for the medication entresto stats he is suppose to be getting a 30 trial and would like a call back at [phone]

## 2020-08-10 NOTE — PROGRESS NOTES
[facility] @ [facility]      Subjective/HPI:     Nia Hoffmann is a 80 y.o. female is here for f/u appt. Hx of multivessel CAD. Underwent PTCA stenting to the Prox/Mid LAD and mid circumflex artery 7/2/20. EF the following day on ECHO 15-20%. Prior to admission was on lopressor and ACEI, she was D/C with Life Vest and lopressor changed to Coreg. She reports since she was seen 7/15 she has been feeling well. Denies CP, SOB/CORTES, orthopnea, PND, edema, lightheadedness or syncope. Weight has been stable between 115-117lbs. -130s/50-60s at home. PCP Provider  Gay Royal MD  Past Medical History:   Diagnosis Date    Abnormality of gait     Essential hypertension, benign     Old myocardial infarction     Other acute and subacute form of ischemic heart disease     Peripheral vascular disease, unspecified ([facility] [address]     Postsurgical percutaneous transluminal coronary angioplasty status 4/27/2012      Past Surgical History:   Procedure Laterality Date    HX BREAST BIOPSY Bilateral     x2 benign 10+ years ago     No Known Allergies   No family history on file. Current Outpatient Medications   Medication Sig    ubidecarenone (COQ-10 PO) Take 200 mg by mouth daily.  doxycycline (VIBRA-TABS) 100 mg tablet TK 1 T PO BID    carvediloL (COREG) 12.5 mg tablet Take 1 Tab by mouth two (2) times daily (with meals).  furosemide (LASIX) 40 mg tablet Take 0.5 tab daily, increasing to 1 tab for weight gain 3lbs in 24hrs    sacubitriL-valsartan (Entresto) 24-26 mg tablet Take 1 Tab by mouth two (2) times a day. D/C Lisinopril    ticagrelor (BRILINTA) 90 mg tablet Take 1 Tab by mouth every twelve (12) hours every twelve (12) hours.  potassium chloride SR (K-TAB) 20 mEq tablet Take 1 Tab by mouth daily.  simvastatin (ZOCOR) 40 mg tablet Take 0.5 Tabs by mouth nightly.  multivitamin (ONE A DAY) tablet Take 1 Tab by mouth daily.     cyanocobalamin (VITAMIN B-12) 1,000 mcg tablet Take 1,000 mcg by mouth daily.  cholecalciferol, vitamin D3, (VITAMIN D3) 2,000 unit Tab Take 1 Cap by mouth daily.  omega-3 fatty acids-vitamin e (FISH OIL) 1,000 mg Cap Take 2 Caps by mouth.  aspirin 81 mg tablet Take 81 mg by mouth daily.  valACYclovir (VALTREX) 500 mg tablet Take 500 mg by mouth as needed. No current facility-administered medications for this visit.        Vitals:    08/11/20 0933   BP: 100/58   Pulse: 94   Resp: 16   SpO2: 99%   Weight: 117 lb 12.8 oz (53.4 kg)   Height: 5' 4\" (1.626 m)     Social History     Socioeconomic History    Marital status: SINGLE     Spouse name: Not on file    Number of children: Not on file    Years of education: Not on file    Highest education level: Not on file   Occupational History    Not on file   Social Needs    Financial resource strain: Not on file    Food insecurity     Worry: Not on file     Inability: Not on file    Transportation needs     Medical: Not on file     Non-medical: Not on file   Tobacco Use    Smoking status: Never Smoker    Smokeless tobacco: Never Used   Substance and Sexual Activity    Alcohol use: No    Drug use: No    Sexual activity: Not on file   Lifestyle    Physical activity     Days per week: Not on file     Minutes per session: Not on file    Stress: Not on file   Relationships    Social connections     Talks on phone: Not on file     Gets together: Not on file     Attends Rastafarian service: Not on file     Active member of club or organization: Not on file     Attends meetings of clubs or organizations: Not on file     Relationship status: Not on file    Intimate partner violence     Fear of current or ex partner: Not on file     Emotionally abused: Not on file     Physically abused: Not on file     Forced sexual activity: Not on file   Other Topics Concern    Not on file   Social History Narrative    Not on file       I have reviewed the nurses notes, vitals, problem list, allergy list, medical history, family, social history and medications. Review of Symptoms:  11 systems reviewed, negative other than as stated in the HPI      Physical Exam:      General: Well developed, in no acute distress, cooperative and alert  HEENT: No carotid bruits, no JVD, trach is midline. Neck Supple, PERRL, EOM intact. Heart:  Normal S1/S2 negative S3 or S4. Regular, no murmur, gallop or rub. Respiratory: Clear bilaterally x 4, no wheezing or rales  Abdomen:   Soft, non-tender, no masses, bowel sounds are active. Extremities:  No edema, normal cap refill, no cyanosis, atraumatic. Neuro: A&Ox3, speech clear, gait stable. Skin: Skin color is normal. No rashes or lesions. Non diaphoretic  Vascular: 2+ pulses symmetric in all extremities    Cardiographics    ECG: NSR, negative T waves. No significant changes when compared with EKG 7/15/20. Cardiology Labs:  Lab Results   Component Value Date/Time    Cholesterol, total 120 07/02/2020 03:37 AM    HDL Cholesterol 54 07/02/2020 03:37 AM    LDL, calculated 53.8 07/02/2020 03:37 AM    Triglyceride 61 07/02/2020 03:37 AM    CHOL/HDL Ratio 2.2 07/02/2020 03:37 AM       Lab Results   Component Value Date/Time    Sodium 140 07/03/2020 05:20 AM    Potassium 3.7 07/03/2020 05:20 AM    Chloride 105 07/03/2020 05:20 AM    CO2 30 07/03/2020 05:20 AM    Anion gap 5 07/03/2020 05:20 AM    Glucose 104 (H) 07/03/2020 05:20 AM    BUN 24 (H) 07/03/2020 05:20 AM    Creatinine 0.76 07/03/2020 05:20 AM    BUN/Creatinine ratio 32 (H) 07/03/2020 05:20 AM    GFR est AA >60 07/03/2020 05:20 AM    GFR est non-AA >60 07/03/2020 05:20 AM    Calcium 9.0 07/03/2020 05:20 AM    Bilirubin, total 0.7 07/01/2020 09:18 AM    Alk.  phosphatase 90 07/01/2020 09:18 AM    Protein, total 7.6 07/01/2020 09:18 AM    Albumin 3.2 (L) 07/01/2020 09:18 AM    Globulin 4.4 (H) 07/01/2020 09:18 AM    A-G Ratio 0.7 (L) 07/01/2020 09:18 AM    ALT (SGPT) 38 07/01/2020 09:18 AM Assessment:     Assessment:     Diagnoses and all orders for this visit:    1. Atherosclerosis of native coronary artery of native heart without angina pectoris  -     AMB POC EKG ROUTINE W/ 12 LEADS, INTER & REP    2. Ischemic cardiomyopathy  -     AMB POC EKG ROUTINE W/ 12 LEADS, INTER & REP    3. Mixed hyperlipidemia  -     AMB POC EKG ROUTINE W/ 12 LEADS, INTER & REP    4. Postsurgical percutaneous transluminal coronary angioplasty status  -     AMB POC EKG ROUTINE W/ 12 LEADS, INTER & REP    Other orders  -     carvediloL (COREG) 12.5 mg tablet; Take 1 Tab by mouth two (2) times daily (with meals). -     furosemide (LASIX) 40 mg tablet; Take 0.5 tab daily, increasing to 1 tab for weight gain 3lbs in 24hrs        ICD-10-CM ICD-9-CM    1. Atherosclerosis of native coronary artery of native heart without angina pectoris  I25.10 414.01 AMB POC EKG ROUTINE W/ 12 LEADS, INTER & REP   2. Ischemic cardiomyopathy  I25.5 414.8 AMB POC EKG ROUTINE W/ 12 LEADS, INTER & REP   3. Mixed hyperlipidemia  E78.2 272.2 AMB POC EKG ROUTINE W/ 12 LEADS, INTER & REP   4. Postsurgical percutaneous transluminal coronary angioplasty status  Z98.61 V45.82 AMB POC EKG ROUTINE W/ 12 LEADS, INTER & REP     Orders Placed This Encounter    AMB POC EKG ROUTINE W/ 12 LEADS, INTER & REP     Order Specific Question:   Reason for Exam:     Answer:   routine    ubidecarenone (COQ-10 PO)     Sig: Take 200 mg by mouth daily.  doxycycline (VIBRA-TABS) 100 mg tablet     Sig: TK 1 T PO BID    carvediloL (COREG) 12.5 mg tablet     Sig: Take 1 Tab by mouth two (2) times daily (with meals). Dispense:  60 Tab     Refill:  5    furosemide (LASIX) 40 mg tablet     Sig: Take 0.5 tab daily, increasing to 1 tab for weight gain 3lbs in 24hrs     Dispense:  30 Tab     Refill:  11        Plan:     1. ASHD: S/P NSTEMI 7/1/2020, Prox LAD 99%, mid LAD 85%,  Mid Circ 85%. PTCA/Stenting. Continue ASA/Brilinta, Betablocker, and statin therapy  2.  Ischemic Cardiomyopathy: Ef 15-20% has life vest, stop Amlodipine, increase Coreg 12.5 mg twice a day, reduce Lasix to 20 mg daily increasing if weight trends up above 117lbs, cont Entresto 24/26 bid. Repeat echo in October  Patient advised if systolic blood pressure hits 100 or less she is to discontinue amlodipine and call my office to advise. 3. Hypertension: see plans above  4.  High Cholesterol: LDL 54 on admission, continue Simvastatin 20mg daily     F/U in 1 month    Dago Navarro MD

## 2020-08-11 ENCOUNTER — OFFICE VISIT (OUTPATIENT)
Dept: CARDIOLOGY CLINIC | Age: 85
End: 2020-08-11
Payer: MEDICARE

## 2020-08-11 VITALS
WEIGHT: 117.8 LBS | SYSTOLIC BLOOD PRESSURE: 100 MMHG | RESPIRATION RATE: 16 BRPM | HEIGHT: 64 IN | OXYGEN SATURATION: 99 % | HEART RATE: 94 BPM | DIASTOLIC BLOOD PRESSURE: 58 MMHG | BODY MASS INDEX: 20.11 KG/M2

## 2020-08-11 DIAGNOSIS — E78.2 MIXED HYPERLIPIDEMIA: ICD-10-CM

## 2020-08-11 DIAGNOSIS — Z98.61 POSTSURGICAL PERCUTANEOUS TRANSLUMINAL CORONARY ANGIOPLASTY STATUS: ICD-10-CM

## 2020-08-11 DIAGNOSIS — I25.10 ATHEROSCLEROSIS OF NATIVE CORONARY ARTERY OF NATIVE HEART WITHOUT ANGINA PECTORIS: Primary | ICD-10-CM

## 2020-08-11 DIAGNOSIS — I25.5 ISCHEMIC CARDIOMYOPATHY: ICD-10-CM

## 2020-08-11 PROCEDURE — G8432 DEP SCR NOT DOC, RNG: HCPCS | Performed by: INTERNAL MEDICINE

## 2020-08-11 PROCEDURE — 1090F PRES/ABSN URINE INCON ASSESS: CPT | Performed by: INTERNAL MEDICINE

## 2020-08-11 PROCEDURE — G8420 CALC BMI NORM PARAMETERS: HCPCS | Performed by: INTERNAL MEDICINE

## 2020-08-11 PROCEDURE — 3288F FALL RISK ASSESSMENT DOCD: CPT | Performed by: INTERNAL MEDICINE

## 2020-08-11 PROCEDURE — 1100F PTFALLS ASSESS-DOCD GE2>/YR: CPT | Performed by: INTERNAL MEDICINE

## 2020-08-11 PROCEDURE — G8427 DOCREV CUR MEDS BY ELIG CLIN: HCPCS | Performed by: INTERNAL MEDICINE

## 2020-08-11 PROCEDURE — G8536 NO DOC ELDER MAL SCRN: HCPCS | Performed by: INTERNAL MEDICINE

## 2020-08-11 PROCEDURE — 99214 OFFICE O/P EST MOD 30 MIN: CPT | Performed by: INTERNAL MEDICINE

## 2020-08-11 PROCEDURE — 93000 ELECTROCARDIOGRAM COMPLETE: CPT | Performed by: INTERNAL MEDICINE

## 2020-08-11 RX ORDER — CARVEDILOL 12.5 MG/1
12.5 TABLET ORAL 2 TIMES DAILY WITH MEALS
Qty: 60 TAB | Refills: 5 | Status: SHIPPED | OUTPATIENT
Start: 2020-08-11 | End: 2020-10-14 | Stop reason: SDUPTHER

## 2020-08-11 RX ORDER — FUROSEMIDE 40 MG/1
TABLET ORAL
Qty: 30 TAB | Refills: 11 | Status: SHIPPED | OUTPATIENT
Start: 2020-08-11 | End: 2021-12-16 | Stop reason: SDUPTHER

## 2020-08-11 RX ORDER — DOXYCYCLINE HYCLATE 100 MG
TABLET ORAL
COMMUNITY
Start: 2020-08-05 | End: 2020-09-29 | Stop reason: ALTCHOICE

## 2020-08-11 NOTE — PROGRESS NOTES
1. Have you been to the ER, urgent care clinic since your last visit? Hospitalized since your last visit? No.    2. Have you seen or consulted any other health care providers outside of the [address] [place] [address] since your last visit? Include any pap smears or colon screening. Seen by PCP.       Chief Complaint   Patient presents with    Follow-up     1 month- pt denies any cardiac symptoms- wearing lifevest

## 2020-08-11 NOTE — LETTER
8/11/20 Patient: Bakari Ricci YOB: 1932 Date of Visit: 8/11/2020 Kemi Bentley [address] [facility] [address] VIA In Basket Dear Gerald Loza MD, Thank you for referring Ms. Bakari Ricci to [facility] for evaluation. My notes for this consultation are attached. If you have questions, please do not hesitate to call me. I look forward to following your patient along with you.  
 
 
Sincerely, 
 
Jey Dudley MD

## 2020-09-16 ENCOUNTER — TELEPHONE (OUTPATIENT)
Dept: CARDIOLOGY CLINIC | Age: 85
End: 2020-09-16

## 2020-09-16 RX ORDER — SACUBITRIL AND VALSARTAN 24; 26 MG/1; MG/1
TABLET, FILM COATED ORAL
Qty: 60 TAB | Refills: 1 | Status: SHIPPED | OUTPATIENT
Start: 2020-09-16 | End: 2020-09-29 | Stop reason: SDUPTHER

## 2020-09-16 NOTE — TELEPHONE ENCOUNTER
Faxed completed [employer] info back to [facility], phone # [phone], fax # [phone]. Reorder form. Next appt 9-.

## 2020-09-17 ENCOUNTER — TELEPHONE (OUTPATIENT)
Dept: CARDIOLOGY CLINIC | Age: 85
End: 2020-09-17

## 2020-09-17 NOTE — TELEPHONE ENCOUNTER
Received labs from endocrinology. Her h/h is lower than previously at 11/33.7. Please make sure this is being follow up on by her PCP. No black/tarry stools? Blood in her urine?

## 2020-09-18 ENCOUNTER — TELEPHONE (OUTPATIENT)
Dept: CARDIOLOGY CLINIC | Age: 85
End: 2020-09-18

## 2020-09-18 NOTE — TELEPHONE ENCOUNTER
Please call patient son Mckenna Latin) returning your call       [phone]    Thanks  Ladonna Herrera

## 2020-09-18 NOTE — TELEPHONE ENCOUNTER
Steven Bland routed conversation to You 43 minutes ago (8:53 AM)       Steven Bland 43 minutes ago (8:53 AM)          Please call patient son Moises Freed) returning your call         [phone]     Thanks  Radha Acuna

## 2020-09-22 NOTE — TELEPHONE ENCOUNTER
Spoke with son, Natalie Mcghee, on HPI. Verified patient with two patient identifiers. Advised blood counts lower than they were. Pt to call PCP to make sure they are following her counts. Denied pt is having black, tarry stools, or blood in urine. He verbalized understanding.

## 2020-09-29 ENCOUNTER — OFFICE VISIT (OUTPATIENT)
Dept: CARDIOLOGY CLINIC | Age: 85
End: 2020-09-29
Payer: MEDICARE

## 2020-09-29 VITALS
BODY MASS INDEX: 21.31 KG/M2 | HEART RATE: 84 BPM | SYSTOLIC BLOOD PRESSURE: 102 MMHG | OXYGEN SATURATION: 96 % | DIASTOLIC BLOOD PRESSURE: 58 MMHG | RESPIRATION RATE: 16 BRPM | WEIGHT: 124.8 LBS | HEIGHT: 64 IN

## 2020-09-29 DIAGNOSIS — I25.10 ATHEROSCLEROSIS OF NATIVE CORONARY ARTERY OF NATIVE HEART WITHOUT ANGINA PECTORIS: Primary | ICD-10-CM

## 2020-09-29 DIAGNOSIS — Z98.61 POSTSURGICAL PERCUTANEOUS TRANSLUMINAL CORONARY ANGIOPLASTY STATUS: ICD-10-CM

## 2020-09-29 DIAGNOSIS — I25.5 ISCHEMIC CARDIOMYOPATHY: ICD-10-CM

## 2020-09-29 DIAGNOSIS — E78.2 MIXED HYPERLIPIDEMIA: ICD-10-CM

## 2020-09-29 PROCEDURE — 99213 OFFICE O/P EST LOW 20 MIN: CPT | Performed by: INTERNAL MEDICINE

## 2020-09-29 PROCEDURE — 1090F PRES/ABSN URINE INCON ASSESS: CPT | Performed by: INTERNAL MEDICINE

## 2020-09-29 PROCEDURE — 93000 ELECTROCARDIOGRAM COMPLETE: CPT | Performed by: INTERNAL MEDICINE

## 2020-09-29 PROCEDURE — G8427 DOCREV CUR MEDS BY ELIG CLIN: HCPCS | Performed by: INTERNAL MEDICINE

## 2020-09-29 PROCEDURE — G8420 CALC BMI NORM PARAMETERS: HCPCS | Performed by: INTERNAL MEDICINE

## 2020-09-29 PROCEDURE — 1100F PTFALLS ASSESS-DOCD GE2>/YR: CPT | Performed by: INTERNAL MEDICINE

## 2020-09-29 PROCEDURE — G8536 NO DOC ELDER MAL SCRN: HCPCS | Performed by: INTERNAL MEDICINE

## 2020-09-29 PROCEDURE — 3288F FALL RISK ASSESSMENT DOCD: CPT | Performed by: INTERNAL MEDICINE

## 2020-09-29 PROCEDURE — G8432 DEP SCR NOT DOC, RNG: HCPCS | Performed by: INTERNAL MEDICINE

## 2020-09-29 RX ORDER — METHIMAZOLE 5 MG/1
TABLET ORAL
COMMUNITY
Start: 2020-09-17

## 2020-09-29 RX ORDER — SACUBITRIL AND VALSARTAN 24; 26 MG/1; MG/1
TABLET, FILM COATED ORAL
Qty: 180 TAB | Refills: 2 | Status: SHIPPED | OUTPATIENT
Start: 2020-09-29 | End: 2021-12-13

## 2020-09-29 NOTE — LETTER
9/29/20 Patient: Lady Conner YOB: 1932 Date of Visit: 9/29/2020 Kemi Mendez [address] [facility] [address] VIA In Basket Dear Zelalem Gaspar MD, Thank you for referring Ms. Lady Conner to [address] [place] [address] for evaluation. My notes for this consultation are attached. If you have questions, please do not hesitate to call me. I look forward to following your patient along with you.  
 
 
Sincerely, 
 
Collin Khan MD

## 2020-09-29 NOTE — PROGRESS NOTES
1. Have you been to the ER, urgent care clinic since your last visit? Hospitalized since your last visit? No.    2. Have you seen or consulted any other health care providers outside of the [address] [place] [address] since your last visit? Include any pap smears or colon screening.    No.      Chief Complaint   Patient presents with    Follow-up     1 month-pt denies any cardiac symptoms- wearing life vest

## 2020-09-29 NOTE — PROGRESS NOTES
[facility] @ [facility]      Subjective/HPI:     Niya Townsend is a 80 y.o. female is here for f/u appt. Hx of multivessel CAD. Underwent PTCA stenting to the Prox/Mid LAD and mid circumflex artery 7/2/20. EF the following day on ECHO 15-20%. She was d/c on a Lifevest and GDMT. She reports since she was seen 8/11 she has been feeling well. Denies CP, SOB/CORTES, orthopnea, PND, edema, lightheadedness or syncope. Weight has been stable between now trending between 120-123lbs on avg. PCP Provider  Shalonda Lopez MD  Past Medical History:   Diagnosis Date    Abnormality of gait     Essential hypertension, benign     Old myocardial infarction     Other acute and subacute form of ischemic heart disease     Peripheral vascular disease, unspecified ([address]     Postsurgical percutaneous transluminal coronary angioplasty status 4/27/2012      Past Surgical History:   Procedure Laterality Date    HX BREAST BIOPSY Bilateral     x2 benign 10+ years ago     No Known Allergies   No family history on file. Current Outpatient Medications   Medication Sig    methIMAzole (TAPAZOLE) 5 mg tablet Every other day    sacubitriL-valsartan (Entresto) 24-26 mg tablet TAKE 1 TABLET BY MOUTH TWICE DAILY    ubidecarenone (COQ-10 PO) Take 200 mg by mouth daily.  carvediloL (COREG) 12.5 mg tablet Take 1 Tab by mouth two (2) times daily (with meals).  furosemide (LASIX) 40 mg tablet Take 0.5 tab daily, increasing to 1 tab for weight gain 3lbs in 24hrs    ticagrelor (BRILINTA) 90 mg tablet Take 1 Tab by mouth every twelve (12) hours every twelve (12) hours.  potassium chloride SR (K-TAB) 20 mEq tablet Take 1 Tab by mouth daily.  simvastatin (ZOCOR) 40 mg tablet Take 0.5 Tabs by mouth nightly.  multivitamin (ONE A DAY) tablet Take 1 Tab by mouth daily.  cyanocobalamin (VITAMIN B-12) 1,000 mcg tablet Take 1,000 mcg by mouth daily.     cholecalciferol, vitamin D3, (VITAMIN D3) 2,000 unit Tab Take 1 Cap by mouth daily.  omega-3 fatty acids-vitamin e (FISH OIL) 1,000 mg Cap Take 2 Caps by mouth.  aspirin 81 mg tablet Take 81 mg by mouth daily.  valACYclovir (VALTREX) 500 mg tablet Take 500 mg by mouth as needed. No current facility-administered medications for this visit. Vitals:    09/29/20 1054   BP: (!) 102/58   Pulse: 84   Resp: 16   SpO2: 96%   Weight: 124 lb 12.8 oz (56.6 kg)   Height: 5' 4\" (1.626 m)     Social History     Socioeconomic History    Marital status: SINGLE     Spouse name: Not on file    Number of children: Not on file    Years of education: Not on file    Highest education level: Not on file   Occupational History    Not on file   Social Needs    Financial resource strain: Not on file    Food insecurity     Worry: Not on file     Inability: Not on file    Transportation needs     Medical: Not on file     Non-medical: Not on file   Tobacco Use    Smoking status: Never Smoker    Smokeless tobacco: Never Used   Substance and Sexual Activity    Alcohol use: No    Drug use: No    Sexual activity: Not on file   Lifestyle    Physical activity     Days per week: Not on file     Minutes per session: Not on file    Stress: Not on file   Relationships    Social connections     Talks on phone: Not on file     Gets together: Not on file     Attends Druze service: Not on file     Active member of club or organization: Not on file     Attends meetings of clubs or organizations: Not on file     Relationship status: Not on file    Intimate partner violence     Fear of current or ex partner: Not on file     Emotionally abused: Not on file     Physically abused: Not on file     Forced sexual activity: Not on file   Other Topics Concern    Not on file   Social History Narrative    Not on file       I have reviewed the nurses notes, vitals, problem list, allergy list, medical history, family, social history and medications.     Review of Symptoms:  11 systems reviewed, negative other than as stated in the HPI      Physical Exam:      General: Well developed, in no acute distress, cooperative and alert  HEENT: No carotid bruits, no JVD, trach is midline. Neck Supple, PERRL, EOM intact. Heart:  Normal S1/S2 negative S3 or S4. Regular, no murmur, gallop or rub. Respiratory: Clear bilaterally x 4, no wheezing or rales  Abdomen:   Soft, non-tender, no masses, bowel sounds are active. Extremities:  No edema, normal cap refill, no cyanosis, atraumatic. Neuro: A&Ox3, speech clear, gait stable. Skin: Skin color is normal. No rashes or lesions. Non diaphoretic  Vascular: 2+ pulses symmetric in all extremities    Cardiographics    ECG: NSR, negative T waves. No significant changes when compared with EKG 8/11/20        Cardiology Labs:  Lab Results   Component Value Date/Time    Cholesterol, total 120 07/02/2020 03:37 AM    HDL Cholesterol 54 07/02/2020 03:37 AM    LDL, calculated 53.8 07/02/2020 03:37 AM    Triglyceride 61 07/02/2020 03:37 AM    CHOL/HDL Ratio 2.2 07/02/2020 03:37 AM       Lab Results   Component Value Date/Time    Sodium 140 07/03/2020 05:20 AM    Potassium 3.7 07/03/2020 05:20 AM    Chloride 105 07/03/2020 05:20 AM    CO2 30 07/03/2020 05:20 AM    Anion gap 5 07/03/2020 05:20 AM    Glucose 104 (H) 07/03/2020 05:20 AM    BUN 24 (H) 07/03/2020 05:20 AM    Creatinine 0.76 07/03/2020 05:20 AM    BUN/Creatinine ratio 32 (H) 07/03/2020 05:20 AM    GFR est AA >60 07/03/2020 05:20 AM    GFR est non-AA >60 07/03/2020 05:20 AM    Calcium 9.0 07/03/2020 05:20 AM    Bilirubin, total 0.7 07/01/2020 09:18 AM    Alk. phosphatase 90 07/01/2020 09:18 AM    Protein, total 7.6 07/01/2020 09:18 AM    Albumin 3.2 (L) 07/01/2020 09:18 AM    Globulin 4.4 (H) 07/01/2020 09:18 AM    A-G Ratio 0.7 (L) 07/01/2020 09:18 AM    ALT (SGPT) 38 07/01/2020 09:18 AM           Assessment:     Assessment:     Diagnoses and all orders for this visit:    1.  Atherosclerosis of native coronary artery of native heart without angina pectoris  -     AMB POC EKG ROUTINE W/ 12 LEADS, INTER & REP  -     ECHO ADULT FOLLOW-UP OR LIMITED; Future    2. Ischemic cardiomyopathy  -     AMB POC EKG ROUTINE W/ 12 LEADS, INTER & REP  -     ECHO ADULT FOLLOW-UP OR LIMITED; Future    3. Mixed hyperlipidemia  -     AMB POC EKG ROUTINE W/ 12 LEADS, INTER & REP  -     ECHO ADULT FOLLOW-UP OR LIMITED; Future    4. Postsurgical percutaneous transluminal coronary angioplasty status  -     AMB POC EKG ROUTINE W/ 12 LEADS, INTER & REP  -     ECHO ADULT FOLLOW-UP OR LIMITED; Future    Other orders  -     sacubitriL-valsartan (Entresto) 24-26 mg tablet; TAKE 1 TABLET BY MOUTH TWICE DAILY        ICD-10-CM ICD-9-CM    1. Atherosclerosis of native coronary artery of native heart without angina pectoris  I25.10 414.01 AMB POC EKG ROUTINE W/ 12 LEADS, INTER & REP      ECHO ADULT FOLLOW-UP OR LIMITED      CANCELED: ECHO ADULT COMPLETE   2. Ischemic cardiomyopathy  I25.5 414.8 AMB POC EKG ROUTINE W/ 12 LEADS, INTER & REP      ECHO ADULT FOLLOW-UP OR LIMITED      CANCELED: ECHO ADULT COMPLETE   3. Mixed hyperlipidemia  E78.2 272.2 AMB POC EKG ROUTINE W/ 12 LEADS, INTER & REP      ECHO ADULT FOLLOW-UP OR LIMITED      CANCELED: ECHO ADULT COMPLETE   4. Postsurgical percutaneous transluminal coronary angioplasty status  Z98.61 V45.82 AMB POC EKG ROUTINE W/ 12 LEADS, INTER & REP      ECHO ADULT FOLLOW-UP OR LIMITED      CANCELED: ECHO ADULT COMPLETE     Orders Placed This Encounter    AMB POC EKG ROUTINE W/ 12 LEADS, INTER & REP     Order Specific Question:   Reason for Exam:     Answer:   routine    methIMAzole (TAPAZOLE) 5 mg tablet     Sig: Every other day    sacubitriL-valsartan (Entresto) 24-26 mg tablet     Sig: TAKE 1 TABLET BY MOUTH TWICE DAILY     Dispense:  180 Tab     Refill:  2        Plan:     1. ASHD: S/P NSTEMI 7/1/2020, Prox LAD 99%, mid LAD 85%,  Mid Circ 85%. PTCA/Stenting.  Denies angina or anginal equivalent complaints. EKG without significant changes noted. Continue ASA/Brilinta, Betablocker, and statin therapy. Come the new year she will likely request a change to Plavix d/t cost. Will let us know if she decides to do so.    2. Ischemic Cardiomyopathy: EF 15-20% has life vest, at her last appt we d/c Amlodipine, increased Coreg 12.5 mg twice a day, reduced Lasix to 20 mg daily, increasing if weight trends up above 117lbs. We cont Entresto 24/26 bid. Since that appt she has been doing well. Weight trending up, but asymptomatic. Compensated, euvolemic. Continue current regimen  Repeat echo in October    3. Hypertension: trending lower range of normal. Tolerating    4.  High Cholesterol: LDL 54 on admission, continue Simvastatin 20mg daily     F/U dependent on echo results      Lisa Raymond NP

## 2020-10-09 ENCOUNTER — ANCILLARY PROCEDURE (OUTPATIENT)
Dept: CARDIOLOGY CLINIC | Age: 85
End: 2020-10-09
Payer: MEDICARE

## 2020-10-09 VITALS
HEIGHT: 64 IN | DIASTOLIC BLOOD PRESSURE: 58 MMHG | BODY MASS INDEX: 21.17 KG/M2 | SYSTOLIC BLOOD PRESSURE: 102 MMHG | WEIGHT: 124 LBS

## 2020-10-09 DIAGNOSIS — I25.10 ATHEROSCLEROSIS OF NATIVE CORONARY ARTERY OF NATIVE HEART WITHOUT ANGINA PECTORIS: ICD-10-CM

## 2020-10-09 DIAGNOSIS — Z98.61 POSTSURGICAL PERCUTANEOUS TRANSLUMINAL CORONARY ANGIOPLASTY STATUS: ICD-10-CM

## 2020-10-09 DIAGNOSIS — I25.5 ISCHEMIC CARDIOMYOPATHY: ICD-10-CM

## 2020-10-09 DIAGNOSIS — E78.2 MIXED HYPERLIPIDEMIA: ICD-10-CM

## 2020-10-09 LAB
ECHO AO ASC DIAM: 2.76 CM
ECHO AO ROOT DIAM: 2.78 CM
ECHO LA AREA 4C: 25.92 CM2
ECHO LA MAJOR AXIS: 4.55 CM
ECHO LA MINOR AXIS: 2.85 CM
ECHO LA VOL 2C: 59.59 ML (ref 22–52)
ECHO LA VOL 4C: 89.08 ML (ref 22–52)
ECHO LA VOL BP: 80.15 ML (ref 22–52)
ECHO LA VOL/BSA BIPLANE: 50.21 ML/M2 (ref 16–28)
ECHO LA VOLUME INDEX A2C: 37.33 ML/M2 (ref 16–28)
ECHO LA VOLUME INDEX A4C: 55.8 ML/M2 (ref 16–28)
ECHO LV EDV A2C: 55.11 ML
ECHO LV EDV A4C: 56.02 ML
ECHO LV EDV BP: 55.5 ML (ref 56–104)
ECHO LV EDV INDEX A4C: 35.1 ML/M2
ECHO LV EDV INDEX BP: 34.8 ML/M2
ECHO LV EDV NDEX A2C: 34.5 ML/M2
ECHO LV EJECTION FRACTION A2C: 59 PERCENT
ECHO LV EJECTION FRACTION A4C: 56 PERCENT
ECHO LV EJECTION FRACTION BIPLANE: 55.7 PERCENT (ref 55–100)
ECHO LV ESV A2C: 22.77 ML
ECHO LV ESV A4C: 24.83 ML
ECHO LV ESV BP: 24.61 ML (ref 19–49)
ECHO LV ESV INDEX A2C: 14.3 ML/M2
ECHO LV ESV INDEX A4C: 15.6 ML/M2
ECHO LV ESV INDEX BP: 15.4 ML/M2
ECHO LV INTERNAL DIMENSION DIASTOLIC: 4.44 CM (ref 3.9–5.3)
ECHO LV INTERNAL DIMENSION SYSTOLIC: 3.46 CM
ECHO LV IVSD: 0.92 CM (ref 0.6–0.9)
ECHO LV MASS 2D: 134.8 G (ref 67–162)
ECHO LV MASS INDEX 2D: 84.5 G/M2 (ref 43–95)
ECHO LV POSTERIOR WALL DIASTOLIC: 0.93 CM (ref 0.6–0.9)

## 2020-10-09 PROCEDURE — 93308 TTE F-UP OR LMTD: CPT | Performed by: INTERNAL MEDICINE

## 2020-10-14 RX ORDER — CARVEDILOL 12.5 MG/1
12.5 TABLET ORAL 2 TIMES DAILY WITH MEALS
Qty: 180 TAB | Refills: 1 | Status: SHIPPED | OUTPATIENT
Start: 2020-10-14 | End: 2021-04-19 | Stop reason: SDUPTHER

## 2020-10-16 ENCOUNTER — TELEPHONE (OUTPATIENT)
Dept: CARDIOLOGY CLINIC | Age: 85
End: 2020-10-16

## 2020-10-16 NOTE — TELEPHONE ENCOUNTER
Spoke with Brian Fry, son. Verified patient with two patient identifiers. Advised EF 55-60%. Great news, may D/C LifeVest and send back to company. He verbalized understanding. Advised Marv with LifeVest.  She verbalized understanding.

## 2020-10-16 NOTE — TELEPHONE ENCOUNTER
Fantastic news, her heart pumping has returned to normal. She can take off the [address] and return it to the company.

## 2020-10-16 NOTE — TELEPHONE ENCOUNTER
Son Konrad Zamora called, on HPI. Verified patient with two patient identifiers. Asking for echo results. Pt has LifeVest, it is irritating to her and wants to D/C LifeVest.    Pls advise.

## 2020-10-23 ENCOUNTER — TELEPHONE (OUTPATIENT)
Dept: CARDIOLOGY CLINIC | Age: 85
End: 2020-10-23

## 2020-10-23 NOTE — TELEPHONE ENCOUNTER
Spoke with patient. Verified patient with two patient identifiers. States pt is doing better today,eating and taking meds. Still having intermittent bouts of N&V. Advised to go ahead and call PCP for Rx for nausea med to have on hand, take it at the first signs of nausea, so she will not skip any meds. Advised importance of all meds, especially anti-coags. He verbalized understanding. Rosie Moran NP sent to Anat Rogers LPN    Caller: Unspecified (Today,  8:15 AM)               Should not stop meds as these are helping keep her stent protected. It is likely the illness from [place] that is causing her to vomit and not the medications itself.  Gastroenteritis can be common symptom of COVID, unfortunately. Advised to take meds with small sip of water or crushed with applesauce if able. If she is truly having trouble keeping down any fluids or foods, then needs to see PCP or some nausea medication.  At her age, she can easily get dehydrated and much worse if she does not keep down food and fluids.      Thanks,   [employer]

## 2020-10-23 NOTE — TELEPHONE ENCOUNTER
Please call patients son Dyllan Roberts) regarding mom. Patient now has COVID and is sick and wants to know if its ok to stop taking medication.   Ms Maira Ramos is vomiting unable to keep anything down and he's not sure if medication is making her sick wants advice on what to do.    [phone]    Thanks  Liana Sarkar

## 2020-12-07 ENCOUNTER — TELEPHONE (OUTPATIENT)
Dept: CARDIOLOGY CLINIC | Age: 85
End: 2020-12-07

## 2020-12-07 NOTE — TELEPHONE ENCOUNTER
Called pt and left message that pt will need a negative COVID test before coming into office tomorrow. Called son Aamir Marimar on [place], advised we need a negative test before we can see pt. He advised he has the negative test. Advised to bring in for her chart and documentation tomorrow. Son verbalized understanding.

## 2020-12-08 ENCOUNTER — OFFICE VISIT (OUTPATIENT)
Dept: CARDIOLOGY CLINIC | Age: 85
End: 2020-12-08
Payer: MEDICARE

## 2020-12-08 VITALS
HEART RATE: 80 BPM | RESPIRATION RATE: 16 BRPM | OXYGEN SATURATION: 99 % | BODY MASS INDEX: 21.07 KG/M2 | SYSTOLIC BLOOD PRESSURE: 130 MMHG | WEIGHT: 123.4 LBS | HEIGHT: 64 IN | DIASTOLIC BLOOD PRESSURE: 70 MMHG

## 2020-12-08 DIAGNOSIS — I25.10 ATHEROSCLEROSIS OF NATIVE CORONARY ARTERY OF NATIVE HEART WITHOUT ANGINA PECTORIS: Primary | ICD-10-CM

## 2020-12-08 DIAGNOSIS — Z98.61 POSTSURGICAL PERCUTANEOUS TRANSLUMINAL CORONARY ANGIOPLASTY STATUS: ICD-10-CM

## 2020-12-08 DIAGNOSIS — E78.2 MIXED HYPERLIPIDEMIA: ICD-10-CM

## 2020-12-08 DIAGNOSIS — Z98.890 S/P CARDIAC CATH: ICD-10-CM

## 2020-12-08 PROCEDURE — 99214 OFFICE O/P EST MOD 30 MIN: CPT | Performed by: INTERNAL MEDICINE

## 2020-12-08 PROCEDURE — 3288F FALL RISK ASSESSMENT DOCD: CPT | Performed by: INTERNAL MEDICINE

## 2020-12-08 PROCEDURE — 1100F PTFALLS ASSESS-DOCD GE2>/YR: CPT | Performed by: INTERNAL MEDICINE

## 2020-12-08 PROCEDURE — G8420 CALC BMI NORM PARAMETERS: HCPCS | Performed by: INTERNAL MEDICINE

## 2020-12-08 PROCEDURE — G8536 NO DOC ELDER MAL SCRN: HCPCS | Performed by: INTERNAL MEDICINE

## 2020-12-08 PROCEDURE — G8427 DOCREV CUR MEDS BY ELIG CLIN: HCPCS | Performed by: INTERNAL MEDICINE

## 2020-12-08 PROCEDURE — 93000 ELECTROCARDIOGRAM COMPLETE: CPT | Performed by: INTERNAL MEDICINE

## 2020-12-08 PROCEDURE — 1090F PRES/ABSN URINE INCON ASSESS: CPT | Performed by: INTERNAL MEDICINE

## 2020-12-08 PROCEDURE — G8432 DEP SCR NOT DOC, RNG: HCPCS | Performed by: INTERNAL MEDICINE

## 2020-12-08 RX ORDER — CLOPIDOGREL BISULFATE 75 MG/1
75 TABLET ORAL DAILY
Qty: 90 TAB | Refills: 3 | Status: SHIPPED | OUTPATIENT
Start: 2020-12-08 | End: 2021-06-08 | Stop reason: ALTCHOICE

## 2020-12-08 NOTE — PROGRESS NOTES
1. Have you been to the ER, urgent care clinic since your last visit? Hospitalized since your last visit? No.    2. Have you seen or consulted any other health care providers outside of the [address] [place] [address] since your last visit? Include any pap smears or colon screening. Seen by PCP for COVID test - test is negative-scanned under media.       Chief Complaint   Patient presents with    Follow-up     2 month -  pt denies any cardiac symptoms

## 2020-12-08 NOTE — LETTER
12/8/20 Patient: Gee Romero YOB: 1932 Date of Visit: 12/8/2020 Kemi Otero [address] [address] VIA In Basket Dear Grzegorz Day MD, Thank you for referring Ms. Gee Romero to [facility] for evaluation. My notes for this consultation are attached. If you have questions, please do not hesitate to call me. I look forward to following your patient along with you.  
 
 
Sincerely, 
 
Asaf Richard MD

## 2020-12-08 NOTE — PROGRESS NOTES
Yehuda Almeida MD          NAME:  Aniceto Francisco   :   1932   MRN:   [id number]   PCP:  Geoff Atkins MD           Subjective: The patient is a 80y.o. year old female  who returns for a routine follow-up. Since the last visit, patient reports no change in exercise tolerance, chest pain, edema, medication intolerance, palpitations, shortness of breath, PND/orthopnea wheezing, sputum, syncope, dizziness or light headedness. Doing well. Past Medical History:   Diagnosis Date    Abnormality of gait     Essential hypertension, benign     Old myocardial infarction     Other acute and subacute form of ischemic heart disease     Peripheral vascular disease, unspecified ([facility] [address]     Postsurgical percutaneous transluminal coronary angioplasty status 2012        ICD-10-CM ICD-9-CM    1. Atherosclerosis of native coronary artery of native heart without angina pectoris  I25.10 414.01 AMB POC EKG ROUTINE W/ 12 LEADS, INTER & REP   2. Mixed hyperlipidemia  E78.2 272.2 AMB POC EKG ROUTINE W/ 12 LEADS, INTER & REP   3. S/P cardiac cath  Z98.890 V45.89 AMB POC EKG ROUTINE W/ 12 LEADS, INTER & REP   4. Postsurgical percutaneous transluminal coronary angioplasty status  Z98.61 V45.82       Social History     Tobacco Use    Smoking status: Never Smoker    Smokeless tobacco: Never Used   Substance Use Topics    Alcohol use: No      No family history on file. Review of Systems  Cardiovascular: Negative except as noted in HPI      Objective:       Vitals:    20 0935   BP: 130/70   Pulse: 80   Resp: 16   SpO2: 99%   Weight: 123 lb 6.4 oz (56 kg)   Height: 5' 4\" (1.626 m)    Body mass index is 21.18 kg/m². General PE  Mental Status - Alert. General Appearance - Not in acute distress. Chest and Lung Exam   Inspection: Accessory muscles - No use of accessory muscles in breathing.   Auscultation:   Breath sounds: - Normal.    Cardiovascular   Inspection: Jugular vein - Bilateral - Inspection Normal.  Palpation/Percussion:   Apical Impulse: - Normal.  Auscultation: Rhythm - Regular. Heart Sounds - S1 WNL and S2 WNL. No S3 or S4. Murmurs & Other Heart Sounds: Auscultation of the heart reveals - No Murmurs. Peripheral Vascular   Upper Extremity: Inspection - Bilateral - No Cyanotic nailbeds or Digital clubbing. Lower Extremity:   Palpation: Edema - Bilateral - No edema. Data Review:     EKG -  EKG: normal EKG, normal sinus rhythm, unchanged from previous tracings. LABS- @brieflabs@      Allergies reviewed  No Known Allergies    Medications reviewed  Current Outpatient Medications   Medication Sig    clopidogreL (Plavix) 75 mg tab Take 1 Tab by mouth daily. Take 4 tablets first day, then one a day    carvediloL (COREG) 12.5 mg tablet Take 1 Tab by mouth two (2) times daily (with meals).  methIMAzole (TAPAZOLE) 5 mg tablet Every other day    sacubitriL-valsartan (Entresto) 24-26 mg tablet TAKE 1 TABLET BY MOUTH TWICE DAILY    ubidecarenone (COQ-10 PO) Take 200 mg by mouth daily.  furosemide (LASIX) 40 mg tablet Take 0.5 tab daily, increasing to 1 tab for weight gain 3lbs in 24hrs    potassium chloride SR (K-TAB) 20 mEq tablet Take 1 Tab by mouth daily.  simvastatin (ZOCOR) 40 mg tablet Take 0.5 Tabs by mouth nightly.  multivitamin (ONE A DAY) tablet Take 1 Tab by mouth daily.  cyanocobalamin (VITAMIN B-12) 1,000 mcg tablet Take 1,000 mcg by mouth daily.  cholecalciferol, vitamin D3, (VITAMIN D3) 2,000 unit Tab Take 1 Cap by mouth daily.  omega-3 fatty acids-vitamin e (FISH OIL) 1,000 mg Cap Take 2 Caps by mouth.  aspirin 81 mg tablet Take 81 mg by mouth daily.  valACYclovir (VALTREX) 500 mg tablet Take 500 mg by mouth as needed. No current facility-administered medications for this visit. Assessment:       ICD-10-CM ICD-9-CM    1.  Atherosclerosis of native coronary artery of native heart without angina pectoris  I25.10 414.01 AMB POC EKG ROUTINE W/ 12 LEADS, INTER & REP   2. Mixed hyperlipidemia  E78.2 272.2 AMB POC EKG ROUTINE W/ 12 LEADS, INTER & REP   3. S/P cardiac cath  Z98.890 V45.89 AMB POC EKG ROUTINE W/ 12 LEADS, INTER & REP   4. Postsurgical percutaneous transluminal coronary angioplasty status  Z98.61 V45.82         Orders Placed This Encounter    AMB POC EKG ROUTINE W/ 12 LEADS, INTER & REP     Order Specific Question:   Reason for Exam:     Answer:   routine    clopidogreL (Plavix) 75 mg tab     Sig: Take 1 Tab by mouth daily. Take 4 tablets first day, then one a day     Dispense:  90 Tab     Refill:  3       Plan:     Full recovery of EF. Off lifevest.  Doing well. BP at target. EKG fine.   F/U 6 mo    Reji Krause MD

## 2021-04-19 RX ORDER — CARVEDILOL 12.5 MG/1
12.5 TABLET ORAL 2 TIMES DAILY WITH MEALS
Qty: 180 TAB | Refills: 1 | Status: SHIPPED | OUTPATIENT
Start: 2021-04-19 | End: 2021-12-06

## 2021-06-08 ENCOUNTER — OFFICE VISIT (OUTPATIENT)
Dept: CARDIOLOGY CLINIC | Age: 86
End: 2021-06-08
Payer: MEDICARE

## 2021-06-08 VITALS
OXYGEN SATURATION: 98 % | RESPIRATION RATE: 18 BRPM | WEIGHT: 122.6 LBS | HEART RATE: 82 BPM | DIASTOLIC BLOOD PRESSURE: 70 MMHG | BODY MASS INDEX: 20.93 KG/M2 | SYSTOLIC BLOOD PRESSURE: 130 MMHG | HEIGHT: 64 IN

## 2021-06-08 DIAGNOSIS — I25.5 ISCHEMIC CARDIOMYOPATHY: ICD-10-CM

## 2021-06-08 DIAGNOSIS — I25.10 ATHEROSCLEROSIS OF NATIVE CORONARY ARTERY OF NATIVE HEART WITHOUT ANGINA PECTORIS: Primary | ICD-10-CM

## 2021-06-08 DIAGNOSIS — E78.2 MIXED HYPERLIPIDEMIA: ICD-10-CM

## 2021-06-08 DIAGNOSIS — Z98.61 POSTSURGICAL PERCUTANEOUS TRANSLUMINAL CORONARY ANGIOPLASTY STATUS: ICD-10-CM

## 2021-06-08 PROCEDURE — 93000 ELECTROCARDIOGRAM COMPLETE: CPT | Performed by: INTERNAL MEDICINE

## 2021-06-08 PROCEDURE — G8536 NO DOC ELDER MAL SCRN: HCPCS | Performed by: INTERNAL MEDICINE

## 2021-06-08 PROCEDURE — G8420 CALC BMI NORM PARAMETERS: HCPCS | Performed by: INTERNAL MEDICINE

## 2021-06-08 PROCEDURE — 1090F PRES/ABSN URINE INCON ASSESS: CPT | Performed by: INTERNAL MEDICINE

## 2021-06-08 PROCEDURE — G8510 SCR DEP NEG, NO PLAN REQD: HCPCS | Performed by: INTERNAL MEDICINE

## 2021-06-08 PROCEDURE — G8427 DOCREV CUR MEDS BY ELIG CLIN: HCPCS | Performed by: INTERNAL MEDICINE

## 2021-06-08 PROCEDURE — 1101F PT FALLS ASSESS-DOCD LE1/YR: CPT | Performed by: INTERNAL MEDICINE

## 2021-06-08 PROCEDURE — 99214 OFFICE O/P EST MOD 30 MIN: CPT | Performed by: INTERNAL MEDICINE

## 2021-06-08 NOTE — PROGRESS NOTES
1. Have you been to the ER, urgent care clinic since your last visit? Hospitalized since your last visit? No.    2. Have you seen or consulted any other health care providers outside of the [facility] since your last visit? Include any pap smears or colon screening.    No.      Chief Complaint   Patient presents with    Coronary Artery Disease     6 month- some chest discomfort and sob that is the same    Cholesterol Problem

## 2021-06-08 NOTE — LETTER
6/8/2021 Patient: Debra Oliveira YOB: 1932 Date of Visit: 6/8/2021 Kemi [address]  [place] [id number] Via Fax: [phone] Dear Christa Álvarez MD, Thank you for referring Ms. Debra Oliveira to [address] for evaluation. My notes for this consultation are attached. If you have questions, please do not hesitate to call me. I look forward to following your patient along with you.  
 
 
Sincerely, 
 
Gera Webster MD

## 2021-06-08 NOTE — PROGRESS NOTES
Henny Pantoja MD          NAME:  Avtar Newton   :   1932   MRN:   [id number]   PCP:  Tono Sifuentes MD           Subjective: The patient is a 80y.o. year old female  who returns for a routine follow-up. She was last seen in our office 2020. She reports she has some issues with her left shoulder, worsened with shoulder movement. She has some mild CORTES, not worsening. Denies orthopnea, PND or edema. Denies lightheadedness or palpitations. Denies near syncope or syncope. Past Medical History:   Diagnosis Date    Abnormality of gait     Essential hypertension, benign     Old myocardial infarction     Other acute and subacute form of ischemic heart disease     [facility], [facility]     Postsurgical percutaneous transluminal coronary angioplasty status 2012        ICD-10-CM ICD-9-CM    1. Atherosclerosis of native coronary artery of native heart without angina pectoris  I25.10 414.01 AMB POC EKG ROUTINE W/ 12 LEADS, INTER & REP   2. Ischemic cardiomyopathy  I25.5 414.8 AMB POC EKG ROUTINE W/ 12 LEADS, INTER & REP   3. Mixed hyperlipidemia  E78.2 272.2 AMB POC EKG ROUTINE W/ 12 LEADS, INTER & REP   4. Postsurgical percutaneous transluminal coronary angioplasty status  Z98.61 V45.82 AMB POC EKG ROUTINE W/ 12 LEADS, INTER & REP      Social History     Tobacco Use    Smoking status: Never Smoker    Smokeless tobacco: Never Used   Substance Use Topics    Alcohol use: No      No family history on file. Review of Systems  Cardiovascular: Negative except as noted in HPI      Objective:       Vitals:    21 0928   BP: 130/70   Pulse: 82   Resp: 18   SpO2: 98%   Weight: 122 lb 9.6 oz (55.6 kg)   Height: 5' 4\" (1.626 m)    Body mass index is 21.04 kg/m². General PE  Mental Status - Alert. General Appearance - Not in acute distress. Chest and Lung Exam   Inspection: Accessory muscles - No use of accessory muscles in breathing.   Auscultation:   Breath sounds: - Normal.    Cardiovascular   Inspection: Jugular vein - Bilateral - Inspection Normal.  Palpation/Percussion:   Apical Impulse: - Normal.  Auscultation: Rhythm - Regular. Heart Sounds - S1 WNL and S2 WNL. No S3 or S4. Murmurs & Other Heart Sounds: Auscultation of the heart reveals - No Murmurs. Peripheral Vascular   Upper Extremity: Inspection - Bilateral - No Cyanotic nailbeds or Digital clubbing. Lower Extremity:   Palpation: Edema - Bilateral - No edema. Data Review:     EKG -  normal sinus rhythm, unchanged from previous tracings. Allergies reviewed  No Known Allergies    Medications reviewed  Current Outpatient Medications   Medication Sig    carvediloL (COREG) 12.5 mg tablet Take 1 Tab by mouth two (2) times daily (with meals). Appt due June 2021    methIMAzole (TAPAZOLE) 5 mg tablet Every other day    sacubitriL-valsartan (Entresto) 24-26 mg tablet TAKE 1 TABLET BY MOUTH TWICE DAILY    ubidecarenone (COQ-10 PO) Take 200 mg by mouth daily.  furosemide (LASIX) 40 mg tablet Take 0.5 tab daily, increasing to 1 tab for weight gain 3lbs in 24hrs    potassium chloride SR (K-TAB) 20 mEq tablet Take 1 Tab by mouth daily.  simvastatin (ZOCOR) 40 mg tablet Take 0.5 Tabs by mouth nightly.  multivitamin (ONE A DAY) tablet Take 1 Tab by mouth daily.  cyanocobalamin (VITAMIN B-12) 1,000 mcg tablet Take 1,000 mcg by mouth daily.  cholecalciferol, vitamin D3, (VITAMIN D3) 2,000 unit Tab Take 1 Cap by mouth daily.  omega-3 fatty acids-vitamin e (FISH OIL) 1,000 mg Cap Take 2 Caps by mouth.  aspirin 81 mg tablet Take 81 mg by mouth daily.  valACYclovir (VALTREX) 500 mg tablet Take 500 mg by mouth as needed. (Patient not taking: Reported on 6/8/2021)     No current facility-administered medications for this visit. Assessment:       ICD-10-CM ICD-9-CM    1.  Atherosclerosis of native coronary artery of native heart without angina pectoris  I25.10 414.01 AMB POC EKG ROUTINE W/ 12 LEADS, INTER & REP   2. Ischemic cardiomyopathy  I25.5 414.8 AMB POC EKG ROUTINE W/ 12 LEADS, INTER & REP   3. Mixed hyperlipidemia  E78.2 272.2 AMB POC EKG ROUTINE W/ 12 LEADS, INTER & REP   4. Postsurgical percutaneous transluminal coronary angioplasty status  Z98.61 V45.82 AMB POC EKG ROUTINE W/ 12 LEADS, INTER & REP        Orders Placed This Encounter    AMB POC EKG ROUTINE W/ 12 LEADS, INTER & REP     Order Specific Question:   Reason for Exam:     Answer:   routine       Plan:     CAD: Hx of multivessel CAD. Underwent PTCA stenting to the Prox/Mid LAD and mid circumflex artery 7/2/20. Current taking Coreg 12.5mg bid, statin, Plavix and ASA. Denies exertional CP, SOB that is worsening or other anginal equivalent symptoms of concern. EKG SR. Cont Coreg, ASA, statin. She is 1 yr post stent 7/2021 and can stop Plavix at that time. Ischemic cardiomyopathy: EF 15-20% in July 2020. Treated with GDMT and limited echo 10/9/20 EF had normalized to 55-60%. Currently on Coreg 12.5mg bid, Entresto 24/26mg bid, and Lasix 20mg daily, increasing prn weight gain. Doing well, weight stable. Appears compensated. Cont on Coreg, Entresto, and Lasix prn  Hyperlipidemia: 8/2020 LDL 64. On Zocor 40mg. Lipids and labs managed by PCP. F/U in 6 months. Patient seen and examined by me with nurse practitioner. Kimberly Azul personally performed all components of the history, physical, and medical decision making and agree with the assessment and plan with minor modifications as noted. Doing well 1 year post PCI with complete recovery of LV function. No angina or CHF. Will stop her plavix 7/1.   F/U 6 mo    Bonnie Becker MD

## 2021-06-11 LAB — HBA1C MFR BLD HPLC: 6.5 %

## 2021-08-06 ENCOUNTER — TELEPHONE (OUTPATIENT)
Dept: CARDIOLOGY CLINIC | Age: 86
End: 2021-08-06

## 2021-08-06 RX ORDER — POTASSIUM CHLORIDE 1500 MG/1
20 TABLET, FILM COATED, EXTENDED RELEASE ORAL DAILY
Qty: 90 TABLET | Refills: 3 | Status: SHIPPED | OUTPATIENT
Start: 2021-08-06 | End: 2022-01-24 | Stop reason: SDUPTHER

## 2021-08-06 NOTE — TELEPHONE ENCOUNTER
Patients son called and said [facility] told him that they are unable to get in touch with us. They need the Potassium Chloride Prescription refilled.  Please contact them @ [phone]    Thank you,

## 2021-08-07 NOTE — TELEPHONE ENCOUNTER
Spoke with patient's son,   Verified patient with two patient identifiers. Advised we had not [place] [facility] but are glad to fill the med. [place] [facility]. He verbalized understanding.

## 2021-12-06 RX ORDER — CARVEDILOL 12.5 MG/1
TABLET ORAL
Qty: 180 TABLET | Refills: 1 | Status: SHIPPED | OUTPATIENT
Start: 2021-12-06

## 2021-12-13 RX ORDER — SACUBITRIL AND VALSARTAN 24; 26 MG/1; MG/1
TABLET, FILM COATED ORAL
Qty: 180 TABLET | Refills: 2 | Status: SHIPPED | OUTPATIENT
Start: 2021-12-13 | End: 2022-03-25 | Stop reason: SDUPTHER

## 2021-12-16 ENCOUNTER — TELEPHONE (OUTPATIENT)
Dept: CARDIOLOGY CLINIC | Age: 86
End: 2021-12-16

## 2021-12-16 RX ORDER — FUROSEMIDE 40 MG/1
TABLET ORAL
Qty: 90 TABLET | Refills: 1 | Status: SHIPPED | OUTPATIENT
Start: 2021-12-16

## 2021-12-16 NOTE — TELEPHONE ENCOUNTER
Have not received any [facility] from [facility] for any [facility]ill. Will send to them.     [employer] to notify pt when they received refill request.

## 2021-12-16 NOTE — TELEPHONE ENCOUNTER
Pt needs refills on furosemide 40 mg. Pt said [facility] said they tried to contact you , but got no reply. Pllease call pt at [phone].       Thanks Alejandra

## 2022-01-24 NOTE — TELEPHONE ENCOUNTER
Pt needs refill on potassium. She would like it sent to [facility] on [address].     Callback is [phone]    Thanks  Alaina Gillette

## 2022-01-25 RX ORDER — POTASSIUM CHLORIDE 1500 MG/1
20 TABLET, FILM COATED, EXTENDED RELEASE ORAL DAILY
Qty: 90 TABLET | Refills: 0 | Status: SHIPPED | OUTPATIENT
Start: 2022-01-25 | End: 2022-01-25 | Stop reason: SDUPTHER

## 2022-01-26 RX ORDER — POTASSIUM CHLORIDE 1500 MG/1
20 TABLET, FILM COATED, EXTENDED RELEASE ORAL DAILY
Qty: 90 TABLET | Refills: 0 | Status: SHIPPED | OUTPATIENT
Start: 2022-01-26

## 2022-02-18 ENCOUNTER — OFFICE VISIT (OUTPATIENT)
Dept: CARDIOLOGY CLINIC | Age: 87
End: 2022-02-18
Payer: MEDICARE

## 2022-02-18 VITALS
BODY MASS INDEX: 20.7 KG/M2 | HEIGHT: 64 IN | SYSTOLIC BLOOD PRESSURE: 138 MMHG | WEIGHT: 121.25 LBS | RESPIRATION RATE: 18 BRPM | DIASTOLIC BLOOD PRESSURE: 82 MMHG | HEART RATE: 73 BPM | OXYGEN SATURATION: 97 %

## 2022-02-18 DIAGNOSIS — I25.5 ISCHEMIC CARDIOMYOPATHY: ICD-10-CM

## 2022-02-18 DIAGNOSIS — I25.10 ATHEROSCLEROSIS OF NATIVE CORONARY ARTERY OF NATIVE HEART WITHOUT ANGINA PECTORIS: Primary | ICD-10-CM

## 2022-02-18 DIAGNOSIS — I10 PRIMARY HYPERTENSION: ICD-10-CM

## 2022-02-18 DIAGNOSIS — E78.2 MIXED HYPERLIPIDEMIA: ICD-10-CM

## 2022-02-18 PROBLEM — I21.4 NSTEMI (NON-ST ELEVATED MYOCARDIAL INFARCTION) (HCC): Status: RESOLVED | Noted: 2020-07-01 | Resolved: 2022-02-18

## 2022-02-18 PROCEDURE — G8427 DOCREV CUR MEDS BY ELIG CLIN: HCPCS | Performed by: NURSE PRACTITIONER

## 2022-02-18 PROCEDURE — G8432 DEP SCR NOT DOC, RNG: HCPCS | Performed by: NURSE PRACTITIONER

## 2022-02-18 PROCEDURE — 99214 OFFICE O/P EST MOD 30 MIN: CPT | Performed by: NURSE PRACTITIONER

## 2022-02-18 PROCEDURE — 93005 ELECTROCARDIOGRAM TRACING: CPT | Performed by: NURSE PRACTITIONER

## 2022-02-18 PROCEDURE — 1101F PT FALLS ASSESS-DOCD LE1/YR: CPT | Performed by: NURSE PRACTITIONER

## 2022-02-18 PROCEDURE — 1090F PRES/ABSN URINE INCON ASSESS: CPT | Performed by: NURSE PRACTITIONER

## 2022-02-18 PROCEDURE — G8420 CALC BMI NORM PARAMETERS: HCPCS | Performed by: NURSE PRACTITIONER

## 2022-02-18 PROCEDURE — 93010 ELECTROCARDIOGRAM REPORT: CPT | Performed by: NURSE PRACTITIONER

## 2022-02-18 PROCEDURE — G8536 NO DOC ELDER MAL SCRN: HCPCS | Performed by: NURSE PRACTITIONER

## 2022-02-18 PROCEDURE — G0463 HOSPITAL OUTPT CLINIC VISIT: HCPCS | Performed by: NURSE PRACTITIONER

## 2022-02-18 NOTE — PROGRESS NOTES
Sultana Dooley, FNP-BC    Subjective/HPI:     Alyx Marte is a 80 y.o. female is here for routine f/u. She has a PMHx of CAD, non-ischemic cardiomyopathy, HTN, HLD and hyperthyroidism. She feels well. Denies complaints of chest pains, dizziness, orthopnea, PND or edema. Denies palpitation symptoms. Current Outpatient Medications on File Prior to Visit   Medication Sig Dispense Refill    potassium chloride SR (K-TAB) 20 mEq tablet Take 1 Tablet by mouth daily. Needs to keep Feb 2022 appt prior to further refills. 90 Tablet 0    furosemide (LASIX) 40 mg tablet Take 0.5 tab daily, increasing to 1 tab for weight gain 3lbs in 24hrs. Pls notify pt when you received this refill. 90 Tablet 1    sacubitriL-valsartan (Entresto) 24-26 mg tablet TAKE 1 TABLET TWICE DAILY 180 Tablet 2    carvediloL (COREG) 12.5 mg tablet TAKE 1 TABLET TWICE DAILY WITH A MEAL (MD APPOINTMENT DUE JUNE 2021) 180 Tablet 1    methIMAzole (TAPAZOLE) 5 mg tablet Every other day      ubidecarenone (COQ-10 PO) Take 200 mg by mouth daily.  simvastatin (ZOCOR) 40 mg tablet Take 0.5 Tabs by mouth nightly. 30 Tab 11    multivitamin (ONE A DAY) tablet Take 1 Tab by mouth daily.  cyanocobalamin (VITAMIN B-12) 1,000 mcg tablet Take 1,000 mcg by mouth daily.  cholecalciferol, vitamin D3, (VITAMIN D3) 2,000 unit Tab Take 1 Cap by mouth daily.  omega-3 fatty acids-vitamin e (FISH OIL) 1,000 mg Cap Take 2 Caps by mouth.  aspirin 81 mg tablet Take 81 mg by mouth daily.  [DISCONTINUED] valACYclovir (VALTREX) 500 mg tablet Take 500 mg by mouth as needed. (Patient not taking: Reported on 6/8/2021)       No current facility-administered medications on file prior to visit. Review of Symptoms:    Review of Systems   Constitutional: Negative for chills, fever and weight loss. HENT: Negative for nosebleeds. Eyes: Negative for blurred vision and double vision.    Respiratory: Negative for cough, shortness of breath and wheezing. Cardiovascular: Negative for chest pain, palpitations, orthopnea, leg swelling and PND. Skin: Negative for rash. Neurological: Negative for dizziness and loss of consciousness. Physical Exam:      General: Well developed, in no acute distress, cooperative and alert  Heart:  reg rate and rhythm; normal S1/S2; no murmurs, no gallops or rubs. Respiratory: Clear bilaterally x 4, no wheezing or rales  Extremities:  Normal cap refill, no cyanosis, atraumatic. No edema. Vascular: 2+ pulses symmetric in all extremities    Vitals:    02/18/22 1430   BP: 138/82   BP 1 Location: Right arm   BP Patient Position: Supine   BP Cuff Size: Small adult   Pulse: 73   Resp: 18   Height: 5' 4\" (1.626 m)   Weight: 121 lb 4 oz (55 kg)   SpO2: 97%       ECG done today shows sinus rhythm     Assessment:       ICD-10-CM ICD-9-CM    1. Atherosclerosis of native coronary artery of native heart without angina pectoris  I25.10 414.01 AMB POC EKG ROUTINE W/ 12 LEADS, INTER & REP   2. Ischemic cardiomyopathy  I25.5 414.8    3. Mixed hyperlipidemia  E78.2 272.2    4. Primary hypertension  I10 401.9         Plan:     1. Atherosclerosis of native coronary artery of native heart without angina pectoris  Hx of EDUARDO to LAD and LCx in 7/2020  Without anginal or anginal equivalent symptoms  Continue BB, ASA, statin therapy    2. Ischemic cardiomyopathy  Hx of ischemic cardiomyopathy, now resolved  Limited echo done 10/2020 with preserved LVEF 55-60%  Appears compensated on exam today  Continue carvedilol, Entresto and Lasix PRN    3. Mixed hyperlipidemia  LDL 73 in 6/2021  Continue statin therapy and low fat, low cholesterol diet  Lipids managed by PCP    4. Primary hypertension  BP controlled.   Continue anti-hypertensive therapy and low sodium diet     F/u with Dr. Khushbu Yeboah in 6 months    Sonia Hernandez NP

## 2022-02-18 NOTE — PROGRESS NOTES
Chief Complaint   Patient presents with    Cardiomyopathy     6 months follow up- Denies cardiac sx. 1. Have you been to the ER, urgent care clinic since your last visit? Hospitalized since your last visit? No    2. Have you seen or consulted any other health care providers outside of the [address] [place] [address] since your last visit? Yes, PCP. Dr. Mello Colin.

## 2022-03-18 PROBLEM — Z98.890 S/P CARDIAC CATH: Status: ACTIVE | Noted: 2020-07-02

## 2022-03-19 PROBLEM — I42.9 CARDIOMYOPATHY (HCC): Status: ACTIVE | Noted: 2020-07-03

## 2022-03-19 PROBLEM — J81.1 PULMONARY EDEMA: Status: ACTIVE | Noted: 2020-07-01

## 2022-03-20 PROBLEM — J96.01 ACUTE RESPIRATORY FAILURE WITH HYPOXIA (HCC): Status: ACTIVE | Noted: 2020-07-01

## 2022-03-25 NOTE — TELEPHONE ENCOUNTER
Pt needs refill on attached medication. The pharmacy is [facility] on [address].     Callback is [phone]    Thanks  Amber Merino

## 2022-03-28 RX ORDER — SACUBITRIL AND VALSARTAN 24; 26 MG/1; MG/1
TABLET, FILM COATED ORAL
Qty: 180 TABLET | Refills: 3 | Status: SHIPPED | OUTPATIENT
Start: 2022-03-28

## (undated) DEVICE — 3M™ TEGADERM™ TRANSPARENT FILM DRESSING FRAME STYLE, 1626W, 4 IN X 4-3/4 IN (10 CM X 12 CM), 50/CT 4CT/CASE: Brand: 3M™ TEGADERM™

## (undated) DEVICE — MICROSURGICAL DILATATION DEVICE: Brand: WOLVERINE CORONARY CUTTING BALLOON

## (undated) DEVICE — SPLINT WR POS F/ARTERIAL ACC -- BX/10

## (undated) DEVICE — CUSTOM KT PTCA INFL DEV K05 00053H

## (undated) DEVICE — TREK CORONARY DILATATION CATHETER 3.0 MM X 15 MM / RAPID-EXCHANGE: Brand: TREK

## (undated) DEVICE — TR BAND RADIAL ARTERY COMPRESSION DEVICE: Brand: TR BAND

## (undated) DEVICE — Device: Brand: ASAHI SION BLUE

## (undated) DEVICE — RADIFOCUS OPTITORQUE ANGIOGRAPHIC CATHETER: Brand: OPTITORQUE

## (undated) DEVICE — TREK CORONARY DILATATION CATHETER 2.50 MM X 15 MM / RAPID-EXCHANGE: Brand: TREK

## (undated) DEVICE — HI-TORQUE VERSACORE FLOPPY GUIDE WIRE SYSTEM 145 CM: Brand: HI-TORQUE VERSACORE

## (undated) DEVICE — ANGIOGRAPHY KIT CUST [K0910930B] [MERIT MEDICAL SYSTEMS INC]

## (undated) DEVICE — CATHETER ETER ANGIO L110CM OD5FR ID046IN L75CM 038IN 145DEG CARD

## (undated) DEVICE — NC TREK CORONARY DILATATION CATHETER 3.0 MM X 8 MM / RAPID-EXCHANGE: Brand: NC TREK

## (undated) DEVICE — SYRINGE ANGIO 10 CC BRL STD PRNT POLYCARB LT BLU MEDALLION

## (undated) DEVICE — GUIDEWIRE VASC L260CM 0.035IN J TIP L3MM PTFE FIX COR NAMIC

## (undated) DEVICE — COPILOT BLEEDBACK CONTROL VALVE: Brand: COPILOT

## (undated) DEVICE — PACK PROCEDURE SURG HRT CATH

## (undated) DEVICE — SYR POWER 150ML 8IN FILL TUBE --

## (undated) DEVICE — SYR ART 700 CLEAR MARK 7 -- ARTERION

## (undated) DEVICE — GLIDESHEATH SLENDER ACCESS KIT: Brand: GLIDESHEATH SLENDER

## (undated) DEVICE — CATH GUID COR EB35 6FR 100CM -- LAUNCHER

## (undated) DEVICE — TUBING PRSS MON L6IN PVC M FEM CONN

## (undated) DEVICE — DRAPE PRB US TRNSDCR 6X96IN --

## (undated) DEVICE — MINI TREK CORONARY DILATATION CATHETER 2.0 MM X 15 MM / RAPID-EXCHANGE: Brand: MINI TREK